# Patient Record
Sex: FEMALE | Race: WHITE | Employment: PART TIME | URBAN - METROPOLITAN AREA
[De-identification: names, ages, dates, MRNs, and addresses within clinical notes are randomized per-mention and may not be internally consistent; named-entity substitution may affect disease eponyms.]

---

## 2017-08-21 ENCOUNTER — HOSPITAL ENCOUNTER (INPATIENT)
Age: 67
LOS: 1 days | Discharge: HOME | DRG: 176 | End: 2017-08-22
Attending: EMERGENCY MEDICINE | Admitting: INTERNAL MEDICINE
Payer: MEDICARE

## 2017-08-21 ENCOUNTER — APPOINTMENT (OUTPATIENT)
Dept: CT IMAGING | Age: 67
DRG: 176 | End: 2017-08-21
Attending: EMERGENCY MEDICINE
Payer: MEDICARE

## 2017-08-21 ENCOUNTER — APPOINTMENT (OUTPATIENT)
Dept: GENERAL RADIOLOGY | Age: 67
DRG: 176 | End: 2017-08-21
Attending: EMERGENCY MEDICINE
Payer: MEDICARE

## 2017-08-21 DIAGNOSIS — R07.9 CHEST PAIN, UNSPECIFIED TYPE: ICD-10-CM

## 2017-08-21 DIAGNOSIS — R53.1 GENERALIZED WEAKNESS: Primary | ICD-10-CM

## 2017-08-21 DIAGNOSIS — R06.02 SOB (SHORTNESS OF BREATH): ICD-10-CM

## 2017-08-21 PROBLEM — I26.99 PULMONARY EMBOLISM (HCC): Status: ACTIVE | Noted: 2017-08-21

## 2017-08-21 PROBLEM — E03.9 HYPOTHYROIDISM: Status: ACTIVE | Noted: 2017-08-21

## 2017-08-21 PROBLEM — I26.99 PE (PULMONARY THROMBOEMBOLISM) (HCC): Status: ACTIVE | Noted: 2017-08-21

## 2017-08-21 LAB
ALBUMIN SERPL-MCNC: 3.3 G/DL (ref 3.4–5)
ALBUMIN/GLOB SERPL: 0.8 {RATIO} (ref 0.8–1.7)
ALP SERPL-CCNC: 119 U/L (ref 45–117)
ALT SERPL-CCNC: 21 U/L (ref 13–56)
ANION GAP SERPL CALC-SCNC: 8 MMOL/L (ref 3–18)
APTT PPP: 113.9 SEC (ref 23–36.4)
APTT PPP: 25.4 SEC (ref 23–36.4)
AST SERPL-CCNC: 21 U/L (ref 15–37)
BILIRUB SERPL-MCNC: 0.5 MG/DL (ref 0.2–1)
BUN SERPL-MCNC: 12 MG/DL (ref 7–18)
BUN/CREAT SERPL: 21 (ref 12–20)
CALCIUM SERPL-MCNC: 10.3 MG/DL (ref 8.5–10.1)
CHLORIDE SERPL-SCNC: 106 MMOL/L (ref 100–108)
CK MB CFR SERPL CALC: NORMAL % (ref 0–4)
CK MB SERPL-MCNC: <1 NG/ML (ref 5–25)
CK SERPL-CCNC: 83 U/L (ref 26–192)
CO2 SERPL-SCNC: 25 MMOL/L (ref 21–32)
CREAT SERPL-MCNC: 0.56 MG/DL (ref 0.6–1.3)
ERYTHROCYTE [DISTWIDTH] IN BLOOD BY AUTOMATED COUNT: 13.8 % (ref 11.6–14.5)
GLOBULIN SER CALC-MCNC: 4.4 G/DL (ref 2–4)
GLUCOSE SERPL-MCNC: 85 MG/DL (ref 74–99)
HCT VFR BLD AUTO: 30.2 % (ref 35–45)
HGB BLD-MCNC: 9.8 G/DL (ref 12–16)
INR PPP: 1 (ref 0.8–1.2)
MCH RBC QN AUTO: 27.8 PG (ref 24–34)
MCHC RBC AUTO-ENTMCNC: 32.5 G/DL (ref 31–37)
MCV RBC AUTO: 85.6 FL (ref 74–97)
PLATELET # BLD AUTO: 222 K/UL (ref 135–420)
PMV BLD AUTO: 9.8 FL (ref 9.2–11.8)
POTASSIUM SERPL-SCNC: 3.1 MMOL/L (ref 3.5–5.5)
PROT SERPL-MCNC: 7.7 G/DL (ref 6.4–8.2)
PROTHROMBIN TIME: 13.1 SEC (ref 11.5–15.2)
RBC # BLD AUTO: 3.53 M/UL (ref 4.2–5.3)
SODIUM SERPL-SCNC: 139 MMOL/L (ref 136–145)
T4 FREE SERPL-MCNC: 1.2 NG/DL (ref 0.7–1.5)
TROPONIN I SERPL-MCNC: <0.02 NG/ML (ref 0–0.04)
TSH SERPL DL<=0.05 MIU/L-ACNC: 1.07 UIU/ML (ref 0.36–3.74)
WBC # BLD AUTO: 4 K/UL (ref 4.6–13.2)

## 2017-08-21 PROCEDURE — 74011250636 HC RX REV CODE- 250/636: Performed by: INTERNAL MEDICINE

## 2017-08-21 PROCEDURE — 82550 ASSAY OF CK (CPK): CPT | Performed by: EMERGENCY MEDICINE

## 2017-08-21 PROCEDURE — 85730 THROMBOPLASTIN TIME PARTIAL: CPT | Performed by: INTERNAL MEDICINE

## 2017-08-21 PROCEDURE — 74011250636 HC RX REV CODE- 250/636: Performed by: EMERGENCY MEDICINE

## 2017-08-21 PROCEDURE — 84439 ASSAY OF FREE THYROXINE: CPT | Performed by: EMERGENCY MEDICINE

## 2017-08-21 PROCEDURE — 74011000258 HC RX REV CODE- 258: Performed by: EMERGENCY MEDICINE

## 2017-08-21 PROCEDURE — 80053 COMPREHEN METABOLIC PANEL: CPT | Performed by: EMERGENCY MEDICINE

## 2017-08-21 PROCEDURE — 85730 THROMBOPLASTIN TIME PARTIAL: CPT | Performed by: EMERGENCY MEDICINE

## 2017-08-21 PROCEDURE — 99285 EMERGENCY DEPT VISIT HI MDM: CPT

## 2017-08-21 PROCEDURE — 71010 XR CHEST SNGL V: CPT

## 2017-08-21 PROCEDURE — 93005 ELECTROCARDIOGRAM TRACING: CPT

## 2017-08-21 PROCEDURE — 74011250637 HC RX REV CODE- 250/637: Performed by: EMERGENCY MEDICINE

## 2017-08-21 PROCEDURE — 85610 PROTHROMBIN TIME: CPT | Performed by: EMERGENCY MEDICINE

## 2017-08-21 PROCEDURE — 71275 CT ANGIOGRAPHY CHEST: CPT

## 2017-08-21 PROCEDURE — 85027 COMPLETE CBC AUTOMATED: CPT | Performed by: EMERGENCY MEDICINE

## 2017-08-21 PROCEDURE — 93970 EXTREMITY STUDY: CPT

## 2017-08-21 PROCEDURE — 96365 THER/PROPH/DIAG IV INF INIT: CPT

## 2017-08-21 PROCEDURE — 36415 COLL VENOUS BLD VENIPUNCTURE: CPT | Performed by: INTERNAL MEDICINE

## 2017-08-21 PROCEDURE — 84443 ASSAY THYROID STIM HORMONE: CPT | Performed by: EMERGENCY MEDICINE

## 2017-08-21 PROCEDURE — 74011636320 HC RX REV CODE- 636/320: Performed by: EMERGENCY MEDICINE

## 2017-08-21 PROCEDURE — 96361 HYDRATE IV INFUSION ADD-ON: CPT

## 2017-08-21 PROCEDURE — 65660000000 HC RM CCU STEPDOWN

## 2017-08-21 RX ORDER — SODIUM CHLORIDE 0.9 % (FLUSH) 0.9 %
5-10 SYRINGE (ML) INJECTION EVERY 8 HOURS
Status: DISCONTINUED | OUTPATIENT
Start: 2017-08-21 | End: 2017-08-22 | Stop reason: HOSPADM

## 2017-08-21 RX ORDER — LEVOTHYROXINE SODIUM 100 UG/1
100 TABLET ORAL
COMMUNITY

## 2017-08-21 RX ORDER — MORPHINE SULFATE 2 MG/ML
2 INJECTION, SOLUTION INTRAMUSCULAR; INTRAVENOUS
Status: DISCONTINUED | OUTPATIENT
Start: 2017-08-21 | End: 2017-08-22 | Stop reason: HOSPADM

## 2017-08-21 RX ORDER — SODIUM CHLORIDE 9 MG/ML
100 INJECTION, SOLUTION INTRAVENOUS
Status: COMPLETED | OUTPATIENT
Start: 2017-08-21 | End: 2017-08-21

## 2017-08-21 RX ORDER — SODIUM CHLORIDE 9 MG/ML
150 INJECTION, SOLUTION INTRAVENOUS CONTINUOUS
Status: DISPENSED | OUTPATIENT
Start: 2017-08-21 | End: 2017-08-21

## 2017-08-21 RX ORDER — POTASSIUM CHLORIDE 1.5 G/1.77G
40 POWDER, FOR SOLUTION ORAL
Status: COMPLETED | OUTPATIENT
Start: 2017-08-21 | End: 2017-08-21

## 2017-08-21 RX ORDER — SODIUM CHLORIDE 0.9 % (FLUSH) 0.9 %
5-10 SYRINGE (ML) INJECTION AS NEEDED
Status: DISCONTINUED | OUTPATIENT
Start: 2017-08-21 | End: 2017-08-22 | Stop reason: HOSPADM

## 2017-08-21 RX ORDER — HEPARIN SODIUM 10000 [USP'U]/100ML
18-36 INJECTION, SOLUTION INTRAVENOUS
Status: DISCONTINUED | OUTPATIENT
Start: 2017-08-21 | End: 2017-08-22 | Stop reason: HOSPADM

## 2017-08-21 RX ORDER — HEPARIN SODIUM 10000 [USP'U]/100ML
18 INJECTION, SOLUTION INTRAVENOUS CONTINUOUS
Status: DISCONTINUED | OUTPATIENT
Start: 2017-08-21 | End: 2017-08-21

## 2017-08-21 RX ORDER — HEPARIN SODIUM 5000 [USP'U]/ML
80 INJECTION, SOLUTION INTRAVENOUS; SUBCUTANEOUS ONCE
Status: COMPLETED | OUTPATIENT
Start: 2017-08-21 | End: 2017-08-21

## 2017-08-21 RX ADMIN — SODIUM CHLORIDE 1000 ML: 900 INJECTION, SOLUTION INTRAVENOUS at 11:46

## 2017-08-21 RX ADMIN — SODIUM CHLORIDE 90 ML: 900 INJECTION, SOLUTION INTRAVENOUS at 12:47

## 2017-08-21 RX ADMIN — POTASSIUM CHLORIDE 40 MEQ: 1.5 POWDER, FOR SOLUTION ORAL at 14:17

## 2017-08-21 RX ADMIN — IOPAMIDOL 71 ML: 755 INJECTION, SOLUTION INTRAVENOUS at 12:47

## 2017-08-21 RX ADMIN — MORPHINE SULFATE 2 MG: 2 INJECTION, SOLUTION INTRAMUSCULAR; INTRAVENOUS at 15:51

## 2017-08-21 RX ADMIN — SODIUM CHLORIDE 150 ML/HR: 900 INJECTION, SOLUTION INTRAVENOUS at 11:46

## 2017-08-21 RX ADMIN — Medication 10 ML: at 22:00

## 2017-08-21 RX ADMIN — HEPARIN SODIUM 3650 UNITS: 5000 INJECTION, SOLUTION INTRAVENOUS; SUBCUTANEOUS at 14:16

## 2017-08-21 RX ADMIN — HEPARIN SODIUM AND DEXTROSE 18 UNITS/KG/HR: 10000; 5 INJECTION INTRAVENOUS at 14:26

## 2017-08-21 NOTE — ED PROVIDER NOTES
HPI Comments: 5:20 AM    Janki Mccann is a 74 y.o. Female presents to the Ed and C/O weakness since 10AM. Pt states tightness in chest, SOB and lightheadedness. Pt describes sharp stabbing pain radiating to the back. Pt travelled to [address] [place] [address] for 800 miles for over 16 hours in the last 2 days. Pt denies N/V and fever and no known blood clot in lungs. Pt denies any other     The history is provided by the patient. No  was used. Past Medical History:   Diagnosis Date    Hypothyroidism        History reviewed. No pertinent surgical history. Family History:   Problem Relation Age of Onset    Heart Disease Father        Social History     Social History    Marital status:      Spouse name: N/A    Number of children: N/A    Years of education: N/A     Occupational History    Not on file. Social History Main Topics    Smoking status: Never Smoker    Smokeless tobacco: Never Used    Alcohol use No    Drug use: No    Sexual activity: Not on file     Other Topics Concern    Not on file     Social History Narrative    No narrative on file         ALLERGIES: Review of patient's allergies indicates no known allergies. Review of Systems   Constitutional: Negative for activity change, fatigue and fever. HENT: Negative for congestion and rhinorrhea. Eyes: Negative for visual disturbance. Respiratory: Positive for chest tightness and shortness of breath. Cardiovascular: Negative for chest pain and palpitations. Gastrointestinal: Negative for abdominal pain, diarrhea, nausea and vomiting. Genitourinary: Negative for dysuria and hematuria. Musculoskeletal: Negative for back pain. Skin: Negative for rash. Neurological: Positive for light-headedness. Negative for dizziness and weakness. All other systems reviewed and are negative.       Vitals:    08/21/17 1500 08/21/17 1515 08/21/17 1530 08/21/17 1545   BP: 155/60 149/63 151/64 155/65 Pulse: 84 79 82 81   Resp: 19 19 20 20   Temp:       SpO2: 100% 99% 100% 100%   Weight:       Height:                Physical Exam   Constitutional: She is oriented to person, place, and time. She appears well-developed and well-nourished. No distress. HENT:   Head: Normocephalic and atraumatic. Right Ear: External ear normal.   Left Ear: External ear normal.   Nose: Nose normal.   Mouth/Throat: Oropharynx is clear and moist.   Eyes: Conjunctivae and EOM are normal. Pupils are equal, round, and reactive to light. No scleral icterus. Neck: Normal range of motion. Neck supple. No JVD present. No tracheal deviation present. No thyromegaly present. no thyroid mass. Cardiovascular: Normal rate, regular rhythm, normal heart sounds and intact distal pulses. Exam reveals no gallop and no friction rub. No murmur heard. Pulmonary/Chest: Effort normal and breath sounds normal. She exhibits no tenderness. Abdominal: Soft. Bowel sounds are normal. She exhibits no distension. There is no tenderness. There is no rebound and no guarding. Musculoskeletal: Normal range of motion. She exhibits no edema or tenderness. Lymphadenopathy:     She has no cervical adenopathy. no cervical lymphadenopathy. Neurological: She is alert and oriented to person, place, and time. No cranial nerve deficit. Coordination normal.   No sensory loss, Gait normal, Motor 5/5   Skin: Skin is warm and dry. Psychiatric: She has a normal mood and affect. Her behavior is normal. Judgment and thought content normal.   Nursing note and vitals reviewed. MDM  Number of Diagnoses or Management Options  Chest pain, unspecified type:   Generalized weakness:   SOB (shortness of breath):   Diagnosis management comments: Patti Krishnamurthy is a 74 y.o.  Female presents with tightness in chest, lightheadedness and SOB; rule out anemia/electrolyte imbalance/thyroid disease/coronary artery disease/PE; pt has no comorbiditiy and no DAILY and these are new symptoms today and pt recently helped moved her daughter;   pt with heart score as following: history slightly suspicious and EKG is normal; age is 74 so she gets a +1, risk factors are 0, elevated troponin          Amount and/or Complexity of Data Reviewed  Clinical lab tests: reviewed and ordered  Tests in the radiology section of CPT®: reviewed and ordered  Tests in the medicine section of CPT®: reviewed and ordered  Obtain history from someone other than the patient: yes  Independent visualization of images, tracings, or specimens: yes    Critical Care  Total time providing critical care: 30-74 minutes (CRITICAL CARE:  I have spent 30 minutes of critical care time involved in lab review, consultations with specialist, family decision-making, and documentation. During this entire length of time I was immediately available to the patient. Critical Care: The reason for providing this level of medical care for this critically ill patient was due a critical illness that impaired one or more vital organ systems such that there was a high probability of imminent or life threatening deterioration in the patients condition. This care involved high complexity decision making to assess, manipulate, and support vital system functions, to treat this degreee vital organ system failure and to prevent further life threatening deterioration of the patients condition.   )    ED Course       Procedures           Vitals:  Patient Vitals for the past 12 hrs:   Temp Pulse Resp BP SpO2   08/21/17 1545 - 81 20 155/65 100 %   08/21/17 1530 - 82 20 151/64 100 %   08/21/17 1515 - 79 19 149/63 99 %   08/21/17 1500 - 84 19 155/60 100 %   08/21/17 1445 - 71 22 159/54 99 %   08/21/17 1430 - 76 17 159/63 100 %   08/21/17 1345 - 83 19 - 100 %   08/21/17 1215 - 72 17 140/66 99 %   08/21/17 1200 - 77 15 138/64 99 %   08/21/17 1145 - 73 19 144/63 99 %   08/21/17 1130 - 69 21 137/54 100 %   08/21/17 1116 - 75 18 142/62 99 % 08/21/17 1102 98.3 °F (36.8 °C) - - - -       Medications Ordered:  Medications   0.9% sodium chloride infusion (0 mL/hr IntraVENous IV Completed 8/21/17 1536)   sodium chloride (NS) flush 5-10 mL (not administered)   heparin 25,000 units in D5W 250 ml infusion (18 Units/kg/hr × 45.4 kg IntraVENous Continued On Admission 8/21/17 1537)   morphine injection 2 mg (2 mg IntraVENous Given 8/21/17 1551)   sodium chloride 0.9 % bolus infusion 1,000 mL (0 mL IntraVENous IV Completed 8/21/17 1246)   iopamidol (ISOVUE-370) 76 % injection 75 mL (71 mL IntraVENous Given 8/21/17 1247)   0.9% sodium chloride infusion 100 mL (90 mL IntraVENous Bolus 8/21/17 1247)   potassium chloride (KLOR-CON) packet 40 mEq (40 mEq Oral Given 8/21/17 1417)   heparin (porcine) injection 3,650 Units (3,650 Units IntraVENous Given 8/21/17 1416)       Lab Findings:  Recent Results (from the past 12 hour(s))   EKG, 12 LEAD, INITIAL    Collection Time: 08/21/17 11:11 AM   Result Value Ref Range    Ventricular Rate 75 BPM    Atrial Rate 75 BPM    P-R Interval 168 ms    QRS Duration 76 ms    Q-T Interval 400 ms    QTC Calculation (Bezet) 446 ms    Calculated P Axis 56 degrees    Calculated R Axis -26 degrees    Calculated T Axis -2 degrees    Diagnosis       Normal sinus rhythm  Minimal voltage criteria for LVH, may be normal variant  Nonspecific ST and T wave abnormality  Abnormal ECG  No previous ECGs available     CBC W/O DIFF    Collection Time: 08/21/17 11:25 AM   Result Value Ref Range    WBC 4.0 (L) 4.6 - 13.2 K/uL    RBC 3.53 (L) 4.20 - 5.30 M/uL    HGB 9.8 (L) 12.0 - 16.0 g/dL    HCT 30.2 (L) 35.0 - 45.0 %    MCV 85.6 74.0 - 97.0 FL    MCH 27.8 24.0 - 34.0 PG    MCHC 32.5 31.0 - 37.0 g/dL    RDW 13.8 11.6 - 14.5 %    PLATELET 217 790 - 652 K/uL    MPV 9.8 9.2 - 11.6 FL   METABOLIC PANEL, COMPREHENSIVE    Collection Time: 08/21/17 11:25 AM   Result Value Ref Range    Sodium 139 136 - 145 mmol/L    Potassium 3.1 (L) 3.5 - 5.5 mmol/L    Chloride 106 100 - 108 mmol/L    CO2 25 21 - 32 mmol/L    Anion gap 8 3.0 - 18 mmol/L    Glucose 85 74 - 99 mg/dL    BUN 12 7.0 - 18 MG/DL    Creatinine 0.56 (L) 0.6 - 1.3 MG/DL    BUN/Creatinine ratio 21 (H) 12 - 20      GFR est AA >60 >60 ml/min/1.73m2    GFR est non-AA >60 >60 ml/min/1.73m2    Calcium 10.3 (H) 8.5 - 10.1 MG/DL    Bilirubin, total 0.5 0.2 - 1.0 MG/DL    ALT (SGPT) 21 13 - 56 U/L    AST (SGOT) 21 15 - 37 U/L    Alk. phosphatase 119 (H) 45 - 117 U/L    Protein, total 7.7 6.4 - 8.2 g/dL    Albumin 3.3 (L) 3.4 - 5.0 g/dL    Globulin 4.4 (H) 2.0 - 4.0 g/dL    A-G Ratio 0.8 0.8 - 1.7     CARDIAC PANEL,(CK, CKMB & TROPONIN)    Collection Time: 08/21/17 11:25 AM   Result Value Ref Range    CK 83 26 - 192 U/L    CK - MB <1.0 <3.6 ng/ml    CK-MB Index  0.0 - 4.0 %     CALCULATION NOT PERFORMED WHEN RESULT IS BELOW LINEAR LIMIT    Troponin-I, Qt. <0.02 0.0 - 0.045 NG/ML   TSH 3RD GENERATION    Collection Time: 08/21/17 11:25 AM   Result Value Ref Range    TSH 1.07 0.36 - 3.74 uIU/mL   T4, FREE    Collection Time: 08/21/17 11:25 AM   Result Value Ref Range    T4, Free 1.2 0.7 - 1.5 NG/DL   PROTHROMBIN TIME + INR    Collection Time: 08/21/17 11:25 AM   Result Value Ref Range    Prothrombin time 13.1 11.5 - 15.2 sec    INR 1.0 0.8 - 1.2     PTT    Collection Time: 08/21/17 11:25 AM   Result Value Ref Range    aPTT 25.4 23.0 - 36.4 SEC       EKG Interpretation by ED physician:  Rhythm: NSR  Rate: 75 bpm  Interpretation: Left axial normal intervals, non-specific T wave inversion in inferior wall. EKG interpret by Johana Santana MD 11:12 AM          X-ray, CT or radiology findings or impressions:  XR CHEST SNGL V   Final Result   Impression:  No radiographic evidence of an acute abnormality. CTA CHEST W OR W WO CONT      IMPRESSION:     Nonocclusive filling defect right lower lobe pulmonary artery compatible with  small pulmonary embolism.  The findings were called to Dr. Sylvester Balderas in the ER on  8/21/2017 at 1351 hours by Dr. Socorro Darling. Band of atelectasis or scarring left lower lobe. DUPLEX LOWER EXT VENOUS BILAT         Progress notes, consult notes, or additional procedure notes:  Progress Note:   11:52 AM   Daughter said pt was lifting heavyweights 24 hours ago. 2:01 PM  All symptoms explained to the patient including CT and she agrees for admission. Reevaluation of the patient:   Stable  No distress  Heparin started  Awaiting bed. Hospitalist at bedside    Diagnosis:   1. Generalized weakness    2. Chest pain, unspecified type    3. SOB (shortness of breath)        Disposition: Adm    Follow-up Information     None           Patient's Medications   Start Taking    No medications on file   Continue Taking    LEVOTHYROXINE (SYNTHROID) 100 MCG TABLET    Take 100 mcg by mouth Daily (before breakfast). These Medications have changed    No medications on file   Stop Taking    No medications on file       Scribe Attestation      Dallas Garrison acting as a scribe for and in the presence of Sharon Flowers MD      August 21, 2017 at 11:21 AM       Provider Attestation:      I personally performed the services described in the documentation, reviewed the documentation, as recorded by the scribe in my presence, and it accurately and completely records my words and actions.  August 21, 2017 at 11:21 AM - Sharon Flowers MD

## 2017-08-21 NOTE — IP AVS SNAPSHOT
Summary of Care Report The Summary of Care report has been created to help improve care coordination. Users with access to [employer] or [address] (Web-based application) may access additional patient information including the Discharge Summary. If you are not currently a [address] user and need more information, please call the number listed below in the [place] [address] section and ask to be connected with Medical Records. Facility Information Name Address Phone [facility] [address] [facility] [address] [phone] Patient Information Patient Name Sex  Andres Morin ([id number]) Female 1950 Discharge Information Admitting Provider Service Area Unit Filemon Barajas MD / [address] [facility] / [phone] Discharge Provider Discharge Date/Time Discharge Disposition Destination (none) 2017 (Pending) AHR (none) Patient Language Language ENGLISH [13] Hospital Problems as of 2017  Reviewed: 2017  2:51 PM by Filemon Barajas MD  
  
  
  
 Class Noted - Resolved Last Modified POA Active Problems * (Principal)Pulmonary embolism ([address]  2017 - Present 2017 by Filemon Barajas MD Yes Entered by Filemon Barajas MD  
  Hypothyroidism  2017 - Present 2017 by Filemon Barajas MD Yes Entered by Filemon Barajas MD  
  PE (pulmonary thromboembolism) ([address]  2017 - Present 2017 by Filemon Barajas MD Unknown Entered by Filemon Barajas MD  
  
Non-Hospital Problems as of 2017  Reviewed: 2017  2:51 PM by Filemon Barajas MD  
 None You are allergic to the following No active allergies Current Discharge Medication List  
  
START taking these medications Dose & Instructions Dispensing Information Comments HYDROcodone-acetaminophen 5-325 mg per tablet Commonly known as:  Bianca Sabra Dose:  1 Tab Take 1 Tab by mouth every six (6) hours as needed for Pain. Max Daily Amount: 4 Tabs. Quantity:  20 Tab Refills:  0 CONTINUE these medications which have NOT CHANGED Dose & Instructions Dispensing Information Comments SYNTHROID 100 mcg tablet Generic drug:  levothyroxine Dose:  100 mcg Take 100 mcg by mouth Daily (before breakfast). Refills:  0 Follow-up Information Follow up With Details Comments Contact Info Phys Other, MD Schedule an appointment as soon as possible for a visit in 1 week For follow-up Patient can only remember the practice name and not the physician Discharge Instructions DISCHARGE SUMMARY from Nurse The following personal items are in your possession at time of discharge: 
 
Dental Appliances: Lowers, Partials Visual Aid: None Home Medications: None Jewelry: Watch, Ring Clothing: Richrd Danisha Other Valuables: Cell Phone, At bedside, Contact Lenses (wearing contact lenses) PATIENT INSTRUCTIONS: 
 
What to do at Home: 
 
Recommended activity: Activity as tolerated, If you experience any of the following symptoms chest pain, nausea, vomiting,bleeding, please follow up with PCP. *  Please give a list of your current medications to your Primary Care Provider. *  Please update this list whenever your medications are discontinued, doses are 
    changed, or new medications (including over-the-counter products) are added. *  Please carry medication information at all times in case of emergency situations. These are general instructions for a healthy lifestyle: No smoking/ No tobacco products/ Avoid exposure to second hand smoke Surgeon General's Warning:  Quitting smoking now greatly reduces serious risk to your health. Obesity, smoking, and sedentary lifestyle greatly increases your risk for illness A healthy diet, regular physical exercise & weight monitoring are important for maintaining a healthy lifestyle You may be retaining fluid if you have a history of heart failure or if you experience any of the following symptoms:  Weight gain of 3 pounds or more overnight or 5 pounds in a week, increased swelling in our hands or feet or shortness of breath while lying flat in bed. Please call your doctor as soon as you notice any of these symptoms; do not wait until your next office visit. Recognize signs and symptoms of STROKE: 
 
F-face looks uneven A-arms unable to move or move unevenly S-speech slurred or non-existent T-time-call 911 as soon as signs and symptoms begin-DO NOT go Back to bed or wait to see if you get better-TIME IS BRAIN. Warning Signs of HEART ATTACK Call 911 if you have these symptoms: 
? Chest discomfort. Most heart attacks involve discomfort in the center of the chest that lasts more than a few minutes, or that goes away and comes back. It can feel like uncomfortable pressure, squeezing, fullness, or pain. ? Discomfort in other areas of the upper body. Symptoms can include pain or discomfort in one or both arms, the back, neck, jaw, or stomach. ? Shortness of breath with or without chest discomfort. ? Other signs may include breaking out in a cold sweat, nausea, or lightheadedness. Don't wait more than five minutes to call [address]! Fast action can save your life. Calling 911 is almost always the fastest way to get lifesaving treatment. Emergency Medical Services staff can begin treatment when they arrive  up to an hour sooner than if someone gets to the hospital by car. The discharge information has been reviewed with the patient. The patient verbalized understanding.  
 
Discharge medications reviewed with the patient and appropriate educational materials and side effects teaching were provided. Pulmonary Embolism: Care Instructions Your Care Instructions Pulmonary embolism is the sudden blockage of an artery in the lung. Blood clots in the deep veins of the leg or pelvis (deep vein thrombosis, or DVT) are the most common cause. These blood clots can travel to the lungs. Pulmonary embolism can be very serious. Because you have had one pulmonary embolism, you are at greater risk for having another one. But you can take steps to prevent another pulmonary embolism by following your doctor's instructions. You will probably take a prescription blood-thinning medicine to prevent blood clots. A blood thinner can stop a blood clot from growing larger and prevent new clots from forming. Follow-up care is a key part of your treatment and safety. Be sure to make and go to all appointments, and call your doctor if you are having problems. It's also a good idea to know your test results and keep a list of the medicines you take. How can you care for yourself at home? · Take your medicines exactly as prescribed. Call your doctor if you think you are having a problem with your medicine. You will get more details on the specific medicines your doctor prescribes. · If you are taking a blood thinner, be sure you get instructions about how to take your medicine safely. Blood thinners can cause serious bleeding problems. Preventing future pulmonary embolisms · Exercise. Keep blood moving in your legs to keep clots from forming. If you are traveling by car, stop every hour or so. Get out and walk around for a few minutes. If you are traveling by bus, train, or plane, get out of your seat and walk up and down the aisles every hour or so. You also can do leg exercises while you are seated. Pump your feet up and down by pulling your toes up toward your knees then pointing them down. · Get up out of bed as soon as possible after an illness or surgery. · Do not smoke. If you need help quitting, talk to your doctor about stop-smoking programs and medicines. These can increase your chances of quitting for good. · Check with your doctor before taking hormone or birth control pills. These may increase your risk of blot clots. · Ask your doctor about wearing compression stockings to help prevent blood clots in your legs. You can buy these with a prescription at medical supply stores and some drugstores. When should you call for help? Call 911 anytime you think you may need emergency care. For example, call if: 
· You have shortness of breath. · You have chest pain. · You passed out (lost consciousness). · You cough up blood. Call your doctor now or seek immediate medical care if: 
· You have new or worsening pain or swelling in your leg. Watch closely for changes in your health, and be sure to contact your doctor if: 
· You do not get better as expected. Where can you learn more? Go to http://elaine-mitesh.info/. Enter J290 in the search box to learn more about \"Pulmonary Embolism: Care Instructions. \" Current as of: June 4, 2016 Content Version: 11.3 © 8698-3827 [employer]. Care instructions adapted under license by [employer] (which disclaims liability or warranty for this information). If you have questions about a medical condition or this instruction, always ask your healthcare professional. [place] [address] any warranty or liability for your use of this information. Chart Review Routing History No Routing History on File

## 2017-08-21 NOTE — H&P
Hospitalist Admission Note    NAME:  Janki Mccann   :   1950   MRN:   [id number]     Date/Time:  2017 3:02 PM    Subjective:     CHIEF COMPLAINT:    Chief Complaint   Patient presents with    Chest Pain       HISTORY OF PRESENT ILLNESS:     Daphney Bill is a 74 y.o.  female with h/o hypothyroidism,came to the ER because of chest tightness,short of breath and lightheadeness. She says that the pain did radiate to thye back. Patient says that she was standing outside when this happened and thought that it was because it was very hot. Once on the ER,CT of chest showed pulmonary embolism. Patient is reporting that she has traveled last week from [address] [place] [address] to [place] [address] by car and it lasted 2 days. She is denying legs pain. Past Medical History:   Diagnosis Date    Hypothyroidism         Social History   Substance Use Topics    Smoking status: Never Smoker    Smokeless tobacco: Never Used    Alcohol use No        Family History   Problem Relation Age of Onset    Heart Disease Father         No Known Allergies     Prior to Admission medications    Medication Sig Start Date End Date Taking? Authorizing Provider   levothyroxine (SYNTHROID) 100 mcg tablet Take 100 mcg by mouth Daily (before breakfast). Yes Phys Other, MD       REVIEW OF SYSTEMS:    Constitutional:  No fever or weight loss  HEENT:  No headache or visual changes  Cardiovascular:  No chest pain, no palpitations. Respiratory:  Short of breath and chest pain. GI:  No abdominal pain. No nausea or vomiting. No diarrhea  :  No hematuria or dysuria. No frequency, retention, urinary incontinence.   Skin:  No rashes or moles  Neuro:  No seizures or syncope  Hematological:  No bruising or bleeding  Endocrine:  No diabetes or thyroid disease  Objective:   VITALS:       Visit Vitals    /66    Pulse 83    Temp 98.3 °F (36.8 °C)    Resp 19    Ht 4' 11\" (1.499 m)    Wt 45.4 kg (100 lb)    SpO2 100%    BMI 20.2 kg/m2            PHYSICAL EXAM: General:    Lying in bed in no acute distress. HEENT:  Pupils equal.  Sclera anicteric. Conjunctiva pink. Mucous membranes                           moist  Neck:  Supple. Trachea midline. No accessory muscle use. No thyromegaly. No jugular venous distention  CV:                  Regular rate and rhythm. Lungs:   Clear to auscultation bilaterally. No Wheezing or Rhonchi. No rales. Normal to percussion  Abdomen:   Soft, non-tender. Not distended. Bowel sounds normal. No organomegaly  Extremities: No cyanosis. No edema. No clubbing  Neurologic: Alert and oriented X 3. Skin:                Warm and dry. No rashes.        LAB DATA REVIEWED:    Recent Results (from the past 24 hour(s))   EKG, 12 LEAD, INITIAL    Collection Time: 08/21/17 11:11 AM   Result Value Ref Range    Ventricular Rate 75 BPM    Atrial Rate 75 BPM    P-R Interval 168 ms    QRS Duration 76 ms    Q-T Interval 400 ms    QTC Calculation (Bezet) 446 ms    Calculated P Axis 56 degrees    Calculated R Axis -26 degrees    Calculated T Axis -2 degrees    Diagnosis       Normal sinus rhythm  Minimal voltage criteria for LVH, may be normal variant  Nonspecific ST and T wave abnormality  Abnormal ECG  No previous ECGs available     CBC W/O DIFF    Collection Time: 08/21/17 11:25 AM   Result Value Ref Range    WBC 4.0 (L) 4.6 - 13.2 K/uL    RBC 3.53 (L) 4.20 - 5.30 M/uL    HGB 9.8 (L) 12.0 - 16.0 g/dL    HCT 30.2 (L) 35.0 - 45.0 %    MCV 85.6 74.0 - 97.0 FL    MCH 27.8 24.0 - 34.0 PG    MCHC 32.5 31.0 - 37.0 g/dL    RDW 13.8 11.6 - 14.5 %    PLATELET 602 439 - 478 K/uL    MPV 9.8 9.2 - 80.5 FL   METABOLIC PANEL, COMPREHENSIVE    Collection Time: 08/21/17 11:25 AM   Result Value Ref Range    Sodium 139 136 - 145 mmol/L    Potassium 3.1 (L) 3.5 - 5.5 mmol/L    Chloride 106 100 - 108 mmol/L    CO2 25 21 - 32 mmol/L    Anion gap 8 3.0 - 18 mmol/L    Glucose 85 74 - 99 mg/dL    BUN 12 7.0 - 18 MG/DL Creatinine 0.56 (L) 0.6 - 1.3 MG/DL    BUN/Creatinine ratio 21 (H) 12 - 20      GFR est AA >60 >60 ml/min/1.73m2    GFR est non-AA >60 >60 ml/min/1.73m2    Calcium 10.3 (H) 8.5 - 10.1 MG/DL    Bilirubin, total 0.5 0.2 - 1.0 MG/DL    ALT (SGPT) 21 13 - 56 U/L    AST (SGOT) 21 15 - 37 U/L    Alk. phosphatase 119 (H) 45 - 117 U/L    Protein, total 7.7 6.4 - 8.2 g/dL    Albumin 3.3 (L) 3.4 - 5.0 g/dL    Globulin 4.4 (H) 2.0 - 4.0 g/dL    A-G Ratio 0.8 0.8 - 1.7     CARDIAC PANEL,(CK, CKMB & TROPONIN)    Collection Time: 08/21/17 11:25 AM   Result Value Ref Range    CK 83 26 - 192 U/L    CK - MB <1.0 <3.6 ng/ml    CK-MB Index  0.0 - 4.0 %     CALCULATION NOT PERFORMED WHEN RESULT IS BELOW LINEAR LIMIT    Troponin-I, Qt. <0.02 0.0 - 0.045 NG/ML   TSH 3RD GENERATION    Collection Time: 08/21/17 11:25 AM   Result Value Ref Range    TSH 1.07 0.36 - 3.74 uIU/mL   T4, FREE    Collection Time: 08/21/17 11:25 AM   Result Value Ref Range    T4, Free 1.2 0.7 - 1.5 NG/DL   PROTHROMBIN TIME + INR    Collection Time: 08/21/17 11:25 AM   Result Value Ref Range    Prothrombin time 13.1 11.5 - 15.2 sec    INR 1.0 0.8 - 1.2     PTT    Collection Time: 08/21/17 11:25 AM   Result Value Ref Range    aPTT 25.4 23.0 - 36.4 SEC       Assessment/Plan:      Principal Problem:    Pulmonary embolism ([address] (8/21/2017)    Active Problems:    Hypothyroidism (8/21/2017)    ___________________________________________________  PLAN:    1. Pumnonary embolism    -On heparin drip.    -I d/w patient regarding new oral AC,no decision made yet. -Morphine for pain. -ECHO ordered    2. Hypothyroidism    -On synthroid.   DVT prophylaxis:on heparin  Full code  Disposition:tbd.     ___________________________________________________  Admitting Physician: Florida Beltran MD

## 2017-08-21 NOTE — IP AVS SNAPSHOT
[address] [place] [address] 
[phone] Patient: Daysi Puente MRN: [id number] Current Discharge Medication List  
  
START taking these medications Dose & Instructions Dispensing Information Comments Morning Noon Evening Bedtime HYDROcodone-acetaminophen 5-325 mg per tablet Commonly known as:  Sophie Cornejo Your next dose is:  Every 6 hours As needed for pain Dose:  1 Tab Take 1 Tab by mouth every six (6) hours as needed for Pain. Max Daily Amount: 4 Tabs. Quantity:  20 Tab Refills:  0 CONTINUE these medications which have NOT CHANGED Dose & Instructions Dispensing Information Comments Morning Noon Evening Bedtime SYNTHROID 100 mcg tablet Generic drug:  levothyroxine Your next dose is:  Tomorrow Dose:  100 mcg Take 100 mcg by mouth Daily (before breakfast). Refills:  0 Where to Get Your Medications Information on where to get these meds will be given to you by the nurse or doctor. ! Ask your nurse or doctor about these medications HYDROcodone-acetaminophen 5-325 mg per tablet

## 2017-08-21 NOTE — ED TRIAGE NOTES
Patient arrived via EMS complaining of chest pain - mid sternal - \"Pressure pain\" that radiates to the back, that is now stronger in the back than in the chest. Patient was given 324 aspirin by EMS and 2 nitro, with no relief of the pain. Patient rates it a 5/10 pain. She is from [place], and drove up here for the [employer] homecoming of her son in law. She started driving on Wednesday morning.

## 2017-08-21 NOTE — PROCEDURES
[facility]  *** FINAL REPORT ***    Name: Nate Lozano  MRN: [id number]    Outpatient  : 1950  HIS Order #: [id number]  [address] [facility] Visit #: [id number]  Date: 21 Aug 2017    TYPE OF TEST: Peripheral Venous Testing    REASON FOR TEST  Suspected pulmonary embolism    Right Leg:-  Deep venous thrombosis:           No  Superficial venous thrombosis:    No  Deep venous insufficiency:        No  Superficial venous insufficiency: No    Left Leg:-  Deep venous thrombosis:           No  Superficial venous thrombosis:    No  Deep venous insufficiency:        No  Superficial venous insufficiency: No      INTERPRETATION/FINDINGS  Duplex images were obtained using 2-D gray scale, color flow, and  spectral Doppler analysis. Right leg :  1. Deep vein(s) visualized include the common femoral, deep femoral,  proximal femoral, mid femoral, distal femoral, popliteal(above knee),  popliteal(fossa), popliteal(below knee), posterior tibial and peroneal   veins. 2. No evidence of deep venous thrombosis detected in the veins  visualized. 3. Superficial vein(s) visualized include the great saphenous and  small saphenous veins. 4. No evidence of superficial thrombosis detected. 5. No evidence of reflux detected in the deep veins visualized. 6. No evidence of reflux detected in the superficial veins visualized. Left leg :  1. Deep vein(s) visualized include the common femoral, deep femoral,  proximal femoral, mid femoral, distal femoral, popliteal(above knee),  popliteal(fossa), popliteal(below knee), posterior tibial and peroneal   veins. 2. No evidence of deep venous thrombosis detected in the veins  visualized. 3. Superficial vein(s) visualized include the great saphenous and  small saphenous veins. 4. No evidence of superficial thrombosis detected. 5. No evidence of reflux detected in the deep veins visualized. 6. No evidence of reflux detected in the superficial veins visualized.     ADDITIONAL COMMENTS    I have personally reviewed the data relevant to the interpretation of  this  study. TECHNOLOGIST: Yann Almonte.  Hardy, RTR, RVT  Signed: 08/21/2017 04:41 PM    PHYSICIAN: Eduard Estrada MD  Signed: 08/21/2017 05:47 PM

## 2017-08-21 NOTE — IP AVS SNAPSHOT
[address] [place] [address] 
[phone] Patient: Jayjay Akhtar MRN: [id number] YLU:3/[id number] You are allergic to the following No active allergies Recent Documentation Height Weight BMI Smoking Status 1.499 m 45.9 kg 20.42 kg/m2 Never Smoker Emergency Contacts Name Discharge Info Relation Home Work Mobile [facility] DISCHARGE CAREGIVER [3] Friend [5] [phone] About your hospitalization You were admitted on:  August 21, 2017 You last received care in the:  [facility] [address] You were discharged on:  August 22, 2017 Unit phone number:  [phone] Why you were hospitalized Your primary diagnosis was:  Pulmonary Embolism (Hcc) Your diagnoses also included:  Hypothyroidism, Pe (Pulmonary Thromboembolism) (Hcc) Providers Seen During Your Hospitalizations Provider Role Specialty Primary office phone Marvin Wright MD Attending Provider Emergency Medicine [phone] Geovanny Guillen MD Attending Provider Internal Medicine [phone] Your Primary Care Physician (PCP) Primary Care Physician Office Phone Office Fax OTHER, PHYS ** None ** ** None ** Follow-up Information Follow up With Details Comments Contact Info Joyce Cooper MD Schedule an appointment as soon as possible for a visit in 1 week For follow-up Patient can only remember the practice name and not the physician Current Discharge Medication List  
  
START taking these medications Dose & Instructions Dispensing Information Comments Morning Noon Evening Bedtime HYDROcodone-acetaminophen 5-325 mg per tablet Commonly known as:  Janas Concord Your next dose is:  Every 6 hours As needed for pain Dose:  1 Tab Take 1 Tab by mouth every six (6) hours as needed for Pain. Max Daily Amount: 4 Tabs. Quantity:  20 Tab Refills:  0 CONTINUE these medications which have NOT CHANGED Dose & Instructions Dispensing Information Comments Morning Noon Evening Bedtime SYNTHROID 100 mcg tablet Generic drug:  levothyroxine Your next dose is:  Tomorrow Dose:  100 mcg Take 100 mcg by mouth Daily (before breakfast). Refills:  0 Where to Get Your Medications Information on where to get these meds will be given to you by the nurse or doctor. ! Ask your nurse or doctor about these medications HYDROcodone-acetaminophen 5-325 mg per tablet Discharge Instructions DISCHARGE SUMMARY from Nurse The following personal items are in your possession at time of discharge: 
 
Dental Appliances: Lowers, Partials Visual Aid: None Home Medications: None Jewelry: Watch, Ring Clothing: Gopi Amado Other Valuables: Cell Phone, At bedside, Contact Lenses (wearing contact lenses) PATIENT INSTRUCTIONS: 
 
What to do at Home: 
 
Recommended activity: Activity as tolerated, If you experience any of the following symptoms chest pain, nausea, vomiting,bleeding, please follow up with PCP. *  Please give a list of your current medications to your Primary Care Provider. *  Please update this list whenever your medications are discontinued, doses are 
    changed, or new medications (including over-the-counter products) are added. *  Please carry medication information at all times in case of emergency situations. These are general instructions for a healthy lifestyle: No smoking/ No tobacco products/ Avoid exposure to second hand smoke Surgeon General's Warning:  Quitting smoking now greatly reduces serious risk to your health. Obesity, smoking, and sedentary lifestyle greatly increases your risk for illness A healthy diet, regular physical exercise & weight monitoring are important for maintaining a healthy lifestyle You may be retaining fluid if you have a history of heart failure or if you experience any of the following symptoms:  Weight gain of 3 pounds or more overnight or 5 pounds in a week, increased swelling in our hands or feet or shortness of breath while lying flat in bed. Please call your doctor as soon as you notice any of these symptoms; do not wait until your next office visit. Recognize signs and symptoms of STROKE: 
 
F-face looks uneven A-arms unable to move or move unevenly S-speech slurred or non-existent T-time-call 911 as soon as signs and symptoms begin-DO NOT go Back to bed or wait to see if you get better-TIME IS BRAIN. Warning Signs of HEART ATTACK Call 911 if you have these symptoms: 
? Chest discomfort. Most heart attacks involve discomfort in the center of the chest that lasts more than a few minutes, or that goes away and comes back. It can feel like uncomfortable pressure, squeezing, fullness, or pain. ? Discomfort in other areas of the upper body. Symptoms can include pain or discomfort in one or both arms, the back, neck, jaw, or stomach. ? Shortness of breath with or without chest discomfort. ? Other signs may include breaking out in a cold sweat, nausea, or lightheadedness. Don't wait more than five minutes to call [address]! Fast action can save your life. Calling 911 is almost always the fastest way to get lifesaving treatment. Emergency Medical Services staff can begin treatment when they arrive  up to an hour sooner than if someone gets to the hospital by car. The discharge information has been reviewed with the patient. The patient verbalized understanding. Discharge medications reviewed with the patient and appropriate educational materials and side effects teaching were provided. Pulmonary Embolism: Care Instructions Your Care Instructions Pulmonary embolism is the sudden blockage of an artery in the lung. Blood clots in the deep veins of the leg or pelvis (deep vein thrombosis, or DVT) are the most common cause. These blood clots can travel to the lungs. Pulmonary embolism can be very serious. Because you have had one pulmonary embolism, you are at greater risk for having another one. But you can take steps to prevent another pulmonary embolism by following your doctor's instructions. You will probably take a prescription blood-thinning medicine to prevent blood clots. A blood thinner can stop a blood clot from growing larger and prevent new clots from forming. Follow-up care is a key part of your treatment and safety. Be sure to make and go to all appointments, and call your doctor if you are having problems. It's also a good idea to know your test results and keep a list of the medicines you take. How can you care for yourself at home? · Take your medicines exactly as prescribed. Call your doctor if you think you are having a problem with your medicine. You will get more details on the specific medicines your doctor prescribes. · If you are taking a blood thinner, be sure you get instructions about how to take your medicine safely. Blood thinners can cause serious bleeding problems. Preventing future pulmonary embolisms · Exercise. Keep blood moving in your legs to keep clots from forming. If you are traveling by car, stop every hour or so. Get out and walk around for a few minutes. If you are traveling by bus, train, or plane, get out of your seat and walk up and down the aisles every hour or so. You also can do leg exercises while you are seated. Pump your feet up and down by pulling your toes up toward your knees then pointing them down. · Get up out of bed as soon as possible after an illness or surgery. · Do not smoke.  If you need help quitting, talk to your doctor about stop-smoking programs and medicines. These can increase your chances of quitting for good. · Check with your doctor before taking hormone or birth control pills. These may increase your risk of blot clots. · Ask your doctor about wearing compression stockings to help prevent blood clots in your legs. You can buy these with a prescription at medical supply stores and some drugstores. When should you call for help? Call 911 anytime you think you may need emergency care. For example, call if: 
· You have shortness of breath. · You have chest pain. · You passed out (lost consciousness). · You cough up blood. Call your doctor now or seek immediate medical care if: 
· You have new or worsening pain or swelling in your leg. Watch closely for changes in your health, and be sure to contact your doctor if: 
· You do not get better as expected. Where can you learn more? Go to http://elaine-mitesh.info/. Enter Q606 in the search box to learn more about \"Pulmonary Embolism: Care Instructions. \" Current as of: June 4, 2016 Content Version: 11.3 © 9506-8368 [employer]. Care instructions adapted under license by [employer] (which disclaims liability or warranty for this information). If you have questions about a medical condition or this instruction, always ask your healthcare professional. [address] any warranty or liability for your use of this information. Discharge Orders None Introducing [facility]! Darion Chow introduces [employer] patient portal. Now you can access parts of your medical record, email your doctor's office, and request medication refills online. 1. In your internet browser, go to [email or web address] 2. Click on the First Time User? Click Here link in the Sign In box. You will see the New Member Sign Up page. 3. Enter your [employer] Access Code exactly as it appears below.  You will not need to use this code after youve completed the sign-up process. If you do not sign up before the expiration date, you must request a new code. · [employer] Access Code: [email or web address] Expires: 11/20/2017  4:59 PM 
 
4. Enter the last four digits of your Social Security Number (xxxx) and Date of Birth (mm/dd/yyyy) as indicated and click Submit. You will be taken to the next sign-up page. 5. Create a [employer] ID. This will be your [employer] login ID and cannot be changed, so think of one that is secure and easy to remember. 6. Create a [employer] password. You can change your password at any time. 7. Enter your Password Reset Question and Answer. This can be used at a later time if you forget your password. 8. Enter your e-mail address. You will receive e-mail notification when new information is available in [address] 9. Click Sign Up. You can now view and download portions of your medical record. 10. Click the Download Summary menu link to download a portable copy of your medical information. If you have questions, please visit the Frequently Asked Questions section of the [employer] website. Remember, [employer] is NOT to be used for urgent needs. For medical emergencies, dial 911. Now available from your iPhone and Android! General Information Please provide this summary of care documentation to your next provider. Patient Signature:  ____________________________________________________________ Date:  ____________________________________________________________  
  
[facility] Provider Signature:  ____________________________________________________________ Date:  ____________________________________________________________

## 2017-08-22 ENCOUNTER — APPOINTMENT (OUTPATIENT)
Dept: GENERAL RADIOLOGY | Age: 67
DRG: 176 | End: 2017-08-22
Attending: INTERNAL MEDICINE
Payer: MEDICARE

## 2017-08-22 VITALS
RESPIRATION RATE: 18 BRPM | BODY MASS INDEX: 20.38 KG/M2 | OXYGEN SATURATION: 92 % | HEIGHT: 59 IN | SYSTOLIC BLOOD PRESSURE: 135 MMHG | HEART RATE: 78 BPM | WEIGHT: 101.1 LBS | TEMPERATURE: 97.5 F | DIASTOLIC BLOOD PRESSURE: 59 MMHG

## 2017-08-22 LAB
ALBUMIN SERPL-MCNC: 3.5 G/DL (ref 3.4–5)
ALBUMIN/GLOB SERPL: 0.7 {RATIO} (ref 0.8–1.7)
ALP SERPL-CCNC: 133 U/L (ref 45–117)
ALT SERPL-CCNC: 21 U/L (ref 13–56)
ANION GAP SERPL CALC-SCNC: 6 MMOL/L (ref 3–18)
APTT PPP: 117.6 SEC (ref 23–36.4)
APTT PPP: 75.2 SEC (ref 23–36.4)
AST SERPL-CCNC: 20 U/L (ref 15–37)
ATRIAL RATE: 75 BPM
BASOPHILS # BLD: 0.1 K/UL (ref 0–0.06)
BASOPHILS NFR BLD: 1 % (ref 0–2)
BILIRUB SERPL-MCNC: 0.6 MG/DL (ref 0.2–1)
BUN SERPL-MCNC: 10 MG/DL (ref 7–18)
BUN/CREAT SERPL: 24 (ref 12–20)
CALCIUM SERPL-MCNC: 10.5 MG/DL (ref 8.5–10.1)
CALCULATED P AXIS, ECG09: 56 DEGREES
CALCULATED R AXIS, ECG10: -26 DEGREES
CALCULATED T AXIS, ECG11: -2 DEGREES
CHLORIDE SERPL-SCNC: 106 MMOL/L (ref 100–108)
CO2 SERPL-SCNC: 28 MMOL/L (ref 21–32)
CREAT SERPL-MCNC: 0.42 MG/DL (ref 0.6–1.3)
DIAGNOSIS, 93000: NORMAL
DIFFERENTIAL METHOD BLD: ABNORMAL
EOSINOPHIL # BLD: 0.2 K/UL (ref 0–0.4)
EOSINOPHIL NFR BLD: 4 % (ref 0–5)
ERYTHROCYTE [DISTWIDTH] IN BLOOD BY AUTOMATED COUNT: 14.1 % (ref 11.6–14.5)
GLOBULIN SER CALC-MCNC: 5.1 G/DL (ref 2–4)
GLUCOSE SERPL-MCNC: 95 MG/DL (ref 74–99)
HCT VFR BLD AUTO: 35 % (ref 35–45)
HGB BLD-MCNC: 11.2 G/DL (ref 12–16)
INR PPP: 1.1 (ref 0.8–1.2)
LYMPHOCYTES # BLD: 2.8 K/UL (ref 0.9–3.6)
LYMPHOCYTES NFR BLD: 53 % (ref 21–52)
MCH RBC QN AUTO: 27.9 PG (ref 24–34)
MCHC RBC AUTO-ENTMCNC: 32 G/DL (ref 31–37)
MCV RBC AUTO: 87.3 FL (ref 74–97)
MONOCYTES # BLD: 0.5 K/UL (ref 0.05–1.2)
MONOCYTES NFR BLD: 10 % (ref 3–10)
NEUTS SEG # BLD: 1.7 K/UL (ref 1.8–8)
NEUTS SEG NFR BLD: 32 % (ref 40–73)
P-R INTERVAL, ECG05: 168 MS
PLATELET # BLD AUTO: 272 K/UL (ref 135–420)
PMV BLD AUTO: 10.4 FL (ref 9.2–11.8)
POTASSIUM SERPL-SCNC: 3.7 MMOL/L (ref 3.5–5.5)
PROT SERPL-MCNC: 8.6 G/DL (ref 6.4–8.2)
PROTHROMBIN TIME: 14 SEC (ref 11.5–15.2)
Q-T INTERVAL, ECG07: 400 MS
QRS DURATION, ECG06: 76 MS
QTC CALCULATION (BEZET), ECG08: 446 MS
RBC # BLD AUTO: 4.01 M/UL (ref 4.2–5.3)
SODIUM SERPL-SCNC: 140 MMOL/L (ref 136–145)
VENTRICULAR RATE, ECG03: 75 BPM
WBC # BLD AUTO: 5.2 K/UL (ref 4.6–13.2)

## 2017-08-22 PROCEDURE — 85730 THROMBOPLASTIN TIME PARTIAL: CPT | Performed by: INTERNAL MEDICINE

## 2017-08-22 PROCEDURE — 36415 COLL VENOUS BLD VENIPUNCTURE: CPT | Performed by: INTERNAL MEDICINE

## 2017-08-22 PROCEDURE — 94760 N-INVAS EAR/PLS OXIMETRY 1: CPT

## 2017-08-22 PROCEDURE — 72070 X-RAY EXAM THORAC SPINE 2VWS: CPT

## 2017-08-22 PROCEDURE — 74011250636 HC RX REV CODE- 250/636: Performed by: HOSPITALIST

## 2017-08-22 PROCEDURE — 85025 COMPLETE CBC W/AUTO DIFF WBC: CPT | Performed by: INTERNAL MEDICINE

## 2017-08-22 PROCEDURE — 72100 X-RAY EXAM L-S SPINE 2/3 VWS: CPT

## 2017-08-22 PROCEDURE — 80053 COMPREHEN METABOLIC PANEL: CPT | Performed by: INTERNAL MEDICINE

## 2017-08-22 PROCEDURE — 93306 TTE W/DOPPLER COMPLETE: CPT

## 2017-08-22 PROCEDURE — 85610 PROTHROMBIN TIME: CPT | Performed by: INTERNAL MEDICINE

## 2017-08-22 PROCEDURE — 74011250636 HC RX REV CODE- 250/636: Performed by: INTERNAL MEDICINE

## 2017-08-22 RX ORDER — HYDRALAZINE HYDROCHLORIDE 20 MG/ML
10 INJECTION INTRAMUSCULAR; INTRAVENOUS ONCE
Status: COMPLETED | OUTPATIENT
Start: 2017-08-22 | End: 2017-08-22

## 2017-08-22 RX ORDER — HYDROCODONE BITARTRATE AND ACETAMINOPHEN 5; 325 MG/1; MG/1
1 TABLET ORAL
Qty: 20 TAB | Refills: 0 | Status: SHIPPED | OUTPATIENT
Start: 2017-08-22

## 2017-08-22 RX ORDER — LEVOTHYROXINE SODIUM 100 UG/1
100 TABLET ORAL
Status: DISCONTINUED | OUTPATIENT
Start: 2017-08-23 | End: 2017-08-22 | Stop reason: HOSPADM

## 2017-08-22 RX ADMIN — Medication 10 ML: at 06:00

## 2017-08-22 RX ADMIN — HYDRALAZINE HYDROCHLORIDE 10 MG: 20 INJECTION INTRAMUSCULAR; INTRAVENOUS at 05:29

## 2017-08-22 RX ADMIN — MORPHINE SULFATE 2 MG: 2 INJECTION, SOLUTION INTRAMUSCULAR; INTRAVENOUS at 05:33

## 2017-08-22 RX ADMIN — Medication 10 ML: at 16:45

## 2017-08-22 NOTE — ROUTINE PROCESS
1000 Heparin drip adjusted. Patient off the floor to Radiology. 1130 Patient back from Radiology. Denies any pain. 1150 Called Echocardiogram - will do test this afternoon. 1430 Patient off the floor for Echocardiogram.   1530 Patient back in the floor. 1654 Patient for discharge today. Heparin stopped. 1715 Discharge instruction given. Encouraged to follow-up with PCP. Pain prescription handed.

## 2017-08-22 NOTE — ACP (ADVANCE CARE PLANNING)
Patient has designated ___husband_____________________ to participate in his/her discharge plan and to receive any needed information.      Name: [facility]  Address: same as pt  Phone number: [phone]

## 2017-08-22 NOTE — DISCHARGE INSTRUCTIONS
DISCHARGE SUMMARY from Nurse    The following personal items are in your possession at time of discharge:    Dental Appliances: Lowers, Partials  Visual Aid: None     Home Medications: None  Jewelry: Watch, Ring  Clothing: Footwear, Dress  Other Valuables: Cell Phone, At bedside, Contact Lenses (wearing contact lenses)             PATIENT INSTRUCTIONS:    What to do at Home:    Recommended activity: Activity as tolerated,     If you experience any of the following symptoms chest pain, nausea, vomiting,bleeding, please follow up with PCP. *  Please give a list of your current medications to your Primary Care Provider. *  Please update this list whenever your medications are discontinued, doses are      changed, or new medications (including over-the-counter products) are added. *  Please carry medication information at all times in case of emergency situations. These are general instructions for a healthy lifestyle:    No smoking/ No tobacco products/ Avoid exposure to second hand smoke    Surgeon General's Warning:  Quitting smoking now greatly reduces serious risk to your health. Obesity, smoking, and sedentary lifestyle greatly increases your risk for illness    A healthy diet, regular physical exercise & weight monitoring are important for maintaining a healthy lifestyle    You may be retaining fluid if you have a history of heart failure or if you experience any of the following symptoms:  Weight gain of 3 pounds or more overnight or 5 pounds in a week, increased swelling in our hands or feet or shortness of breath while lying flat in bed. Please call your doctor as soon as you notice any of these symptoms; do not wait until your next office visit.     Recognize signs and symptoms of STROKE:    F-face looks uneven    A-arms unable to move or move unevenly    S-speech slurred or non-existent    T-time-call 911 as soon as signs and symptoms begin-DO NOT go       Back to bed or wait to see if you get better-TIME IS BRAIN. Warning Signs of HEART ATTACK     Call 911 if you have these symptoms:   Chest discomfort. Most heart attacks involve discomfort in the center of the chest that lasts more than a few minutes, or that goes away and comes back. It can feel like uncomfortable pressure, squeezing, fullness, or pain.  Discomfort in other areas of the upper body. Symptoms can include pain or discomfort in one or both arms, the back, neck, jaw, or stomach.  Shortness of breath with or without chest discomfort.  Other signs may include breaking out in a cold sweat, nausea, or lightheadedness. Don't wait more than five minutes to call 911 - MINUTES MATTER! Fast action can save your life. Calling 911 is almost always the fastest way to get lifesaving treatment. Emergency Medical Services staff can begin treatment when they arrive -- up to an hour sooner than if someone gets to the hospital by car. The discharge information has been reviewed with the patient. The patient verbalized understanding. Discharge medications reviewed with the patient and appropriate educational materials and side effects teaching were provided. Pulmonary Embolism: Care Instructions  Your Care Instructions  Pulmonary embolism is the sudden blockage of an artery in the lung. Blood clots in the deep veins of the leg or pelvis (deep vein thrombosis, or DVT) are the most common cause. These blood clots can travel to the lungs. Pulmonary embolism can be very serious. Because you have had one pulmonary embolism, you are at greater risk for having another one. But you can take steps to prevent another pulmonary embolism by following your doctor's instructions. You will probably take a prescription blood-thinning medicine to prevent blood clots. A blood thinner can stop a blood clot from growing larger and prevent new clots from forming. Follow-up care is a key part of your treatment and safety.  Be sure to make and go to all appointments, and call your doctor if you are having problems. It's also a good idea to know your test results and keep a list of the medicines you take. How can you care for yourself at home? · Take your medicines exactly as prescribed. Call your doctor if you think you are having a problem with your medicine. You will get more details on the specific medicines your doctor prescribes. · If you are taking a blood thinner, be sure you get instructions about how to take your medicine safely. Blood thinners can cause serious bleeding problems. Preventing future pulmonary embolisms  · Exercise. Keep blood moving in your legs to keep clots from forming. If you are traveling by car, stop every hour or so. Get out and walk around for a few minutes. If you are traveling by bus, train, or plane, get out of your seat and walk up and down the aisles every hour or so. You also can do leg exercises while you are seated. Pump your feet up and down by pulling your toes up toward your knees then pointing them down. · Get up out of bed as soon as possible after an illness or surgery. · Do not smoke. If you need help quitting, talk to your doctor about stop-smoking programs and medicines. These can increase your chances of quitting for good. · Check with your doctor before taking hormone or birth control pills. These may increase your risk of blot clots. · Ask your doctor about wearing compression stockings to help prevent blood clots in your legs. You can buy these with a prescription at medical supply stores and some drugstores. When should you call for help? Call 911 anytime you think you may need emergency care. For example, call if:  · You have shortness of breath. · You have chest pain. · You passed out (lost consciousness). · You cough up blood. Call your doctor now or seek immediate medical care if:  · You have new or worsening pain or swelling in your leg.   Watch closely for changes in your health, and be sure to contact your doctor if:  · You do not get better as expected. Where can you learn more? Go to http://elaine-mitesh.info/. Enter D000 in the search box to learn more about \"Pulmonary Embolism: Care Instructions. \"  Current as of: June 4, 2016  Content Version: 11.3  © 5593-8635 [employer]. Care instructions adapted under license by [employer] (which disclaims liability or warranty for this information). If you have questions about a medical condition or this instruction, always ask your healthcare professional. [place] [address] any warranty or liability for your use of this information.

## 2017-08-22 NOTE — DISCHARGE SUMMARY
Discharge Summary    Patient: Homa Shi               Sex: female          DOA: 8/21/2017         YOB: 1950      Age:  74 y.o.        LOS:  LOS: 1 day                Admit Date: 8/21/2017    Discharge Date: 8/22/2017    Admission Diagnoses: PE (pulmonary thromboembolism) ([facility] [address]    Discharge Diagnoses:    Possible PE  Near syncope   Heart murmur  Back pain Hypothyroidism  Volume depletion. Hypothyroidism    Discharge Medications:     Current Discharge Medication List      START taking these medications    Details   HYDROcodone-acetaminophen (NORCO) 5-325 mg per tablet Take 1 Tab by mouth every six (6) hours as needed for Pain. Max Daily Amount: 4 Tabs. Qty: 20 Tab, Refills: 0         CONTINUE these medications which have NOT CHANGED    Details   levothyroxine (SYNTHROID) 100 mcg tablet Take 100 mcg by mouth Daily (before breakfast). Follow-up:pcp    Discharge Condition:good    Activity:as tolerated    Diet:regular    Labs:  Labs: Results:       Chemistry Recent Labs      08/22/17   0611  08/21/17   1125   GLU  95  85   NA  140  139   K  3.7  3.1*   CL  106  106   CO2  28  25   BUN  10  12   CREA  0.42*  0.56*   CA  10.5*  10.3*   AGAP  6  8   BUCR  24*  21*   AP  133*  119*   TP  8.6*  7.7   ALB  3.5  3.3*   GLOB  5.1*  4.4*   AGRAT  0.7*  0.8      CBC w/Diff Recent Labs      08/22/17   0611  08/21/17   1125   WBC  5.2  4.0*   RBC  4.01*  3.53*   HGB  11.2*  9.8*   HCT  35.0  30.2*   PLT  272  222   GRANS  32*   --    LYMPH  53*   --    EOS  4   --       Cardiac Enzymes Recent Labs      08/21/17   1125   CPK  83   CKND1  CALCULATION NOT PERFORMED WHEN RESULT IS BELOW LINEAR LIMIT      Coagulation Recent Labs      08/22/17   1545  08/22/17   0611   08/21/17   1125   PTP   --   14.0   --   13.1   INR   --   1.1   --   1.0   APTT  75.2*  117.6*   < >  25.4    < > = values in this interval not displayed.        Lipid Panel No results found for: CHOL, [address], [address] [facility], [address] [place] [address], [address] [place] [address] Rd, [id number], HDL, LDL, LDLC, DLDLP, 169104, VLDLC, VLDL, TGLX, TRIGL, TRIGP, TGLPOCT, CHHD, CHHDX   BNP No results for input(s): BNPP in the last 72 hours. Liver Enzymes Recent Labs      08/22/17   0611   TP  8.6*   ALB  3.5   AP  133*   SGOT  20      Thyroid Studies Lab Results   Component Value Date/Time    TSH 1.07 08/21/2017 11:25 AM          Imaging:  CTA chest on 8/21/2017:  Nonocclusive filling defect right lower lobe pulmonary artery compatible with  small pulmonary embolism. The findings were called to Dr. Toni Barrett in the ER on  8/21/2017 at 1351 hours by Dr. Francois Segura. Band of atelectasis or scarring left lower lobe. Peripheral Venous Testing  REASON FOR TEST  Suspected pulmonary embolism     Right Leg:-  Deep venous thrombosis:           No  Superficial venous thrombosis:    No  Deep venous insufficiency:        No  Superficial venous insufficiency: No     Left Leg:-  Deep venous thrombosis:           No  Superficial venous thrombosis:    No  Deep venous insufficiency:        No  Superficial venous insufficiency: No    Consults:   Pulmonary    Treatment Team: Treatment Team: Attending Provider: Efrem Falk MD; Utilization Review: Nikhil Ag, BRISA; Utilization Review: Marlys Thomason RN    Significant Diagnostic Studies:see recent lab results    HISTORY OF PRESENT ILLNESS:     Ted Patel is a 74 y.o.  female with h/o hypothyroidism,came to the ER because of chest tightness,short of breath and lightheadeness. She says that the pain did radiate to thye back. Patient says that she was standing outside when this happened and thought that it was because it was very hot. Once on the ER,CT of chest showed pulmonary embolism. Patient is reporting that she has traveled last week from [address] [place] [address] to [place] [address] by car and it lasted 2 days. She is denying legs pain. Hospital Course:  After being admitted,patient was continued on heparin drip. ECHO was ordered,results pending for now. Pulmonologist was consulted and evaluated patient. After reviewed all the tests,he concluded that patient did not need AC. Patient's complaint was suggestive of orthostasis,mild heat exhaustion. He indicated that patient has a systolic murmur which needs re-evaluation,I defer it to his pcp. ECHO was done but results are pending for now. During thisd admission,patient also complained of back pain,mostly T-spine and Xray done showed minimal spondylosis without acute or aggressive osseous lesion demonstrated. Patient was prescribed norco on discharge. Patient is clinically stable and will be discharged. P/E  Lungs ctab  Heart s1s2 nl,no m/g  Abdm:soft,not tender,bs present. Extr:no edema,good pulses. Neuro:aaox4    Time for discharge:35 minutes.     Mae Burroughs MD  August 22, 2017

## 2017-08-22 NOTE — MANAGEMENT PLAN
[facility]   Discharge Planning/ Assessment    Reasons for Intervention:   Interviewed patient. Verified demographics listed on face sheet with patient; all information correct. Pt lives in [place] and is here visiting daughter and her  who just returned from deployment. Pt has Medicare A&B and [place] [employer]. Patient stated their PCP is Dr Yesenia Dutton and last appt 2 weeks ago . Patient's NOK is , Mart Listen at [phone]. Patient independent with ADLs prior to admission. No use of DME prior to admission.  Discharge plan is Home and follow up with pcp  Medardo Wise RN BSN  Outcomes Manager  Pager # [phone]    High Risk Criteria  [] Yes  [x]No   Physician Referral  [] Yes  [x]No        Date    Nursing Referral  [] Yes  [x]No        Date    Patient/Family Request  [] Yes  [x]No        Date       Resources:    Medicare  [x] Yes  []No   Medicaid  [] Yes  [x]No   No Resources  [] Yes  [x]No   Private Insurance  [x] Yes  []No    Name/Phone Number    Other  [] Yes  [x]No        (i.e. Workman's Comp)         Prior Services:    Prior Services  [] Yes  [x]No   Home Health  [] Yes  [x]No   [address] [place]  [] Yes  [x]No        Number of [address]  [] Yes  [x]No       Meals on Wheels  [] Yes  [x]No   Office on Aging  [] Yes  [x]No   Transportation Services  [] Yes  [x]No   Nursing Home  [] Yes  [x]No        Nursing Home Name    [address]  [] Yes  [x]No        [address] Name    Other       Information Source:      Information obtained from  [x] Patient  [] Parent   [] [address]   [] Child  [] Spouse   [] Significant Other/Partner   [] Friend      [] EMS    [] Nursing Home Chart          [] Other:   Chart Review  [x] Yes  []No     Family/Support System:    Patient lives with  [] Alone    [x] Spouse   [] Significant Other  [] Children  [] Caretaker   [] Parent  [] Sibling     [] Other       Other Support System:    Is the patient responsible for care of others  [] Yes  [x]No   Information of person caring for patient on  discharge    Managers financial affairs independently  [x] Yes  []No   If no, explain:      Status Prior to Admission:    Mental Status  [x] Awake  [x] Alert  [x] Oriented  [] Quiet/Calm [] Lethargic/Sedated   [] Disoriented  [] Restless/Anxious  [] Combative   Personal Care  [] Dependent  [x] [address]  [] Requires Assistance   Meal Preparation Ability  [x] Independent   [] Standby Assistance   [] Minimal Assistance   [] Moderate Assistance  [] Maximum Assistance     [] Total Assistance   Chores  [x] Independent with Chores   [] [address] [place] Resident   [] Requires Assistance   Bowel/Bladder  [x] Continent  [] Catheter  [] Incontinent  [] Ostomy Self-Care    [] Urine Diversion Self-Care  [] Maximum Assistance     [] Total Assistance   Number of Persons needed for assistance    DME at home  [] Florentin Kirby  [] Eduardo Kirby   [] Commode    [] Bathroom/Grab Bars  [] Hospital Bed  [] Nebulizer  [] Oxygen           [] Raised Toilet Seat  [] Shower Chair  [] Side Rails for Bed   [] Tub Transfer Bench   [] Effie Choudhary  [] James James      [] Other:   Vendor      Treatment Presently Receiving:    Current Treatments  [] Chemotherapy  [] Dialysis  [] Insulin  [] IVAB [x] IVF   [] O2  [] PCA   [] PT   [] RT   [] Tube Feedings   [] Wound Care     Psychosocial Evaluation:    Verbalized Knowledge of Disease Process  [] Patient  []Family   Coping with Disease Process  [] Patient  []Family   Requires Further Counseling Coping with Disease Process  [] Patient  []Family     Identified Projected Needs:    Home Health Aid  [] Yes  [x]No   Transportation  [] Yes  [x]No   Education  [] Yes  [x]No        Specific Education     Financial Counseling  [] Yes  [x]No   Inability to Care for Self/Will Require 24 hour care  [] Yes  [x]No   Pain Management  [] Yes  [x]No   Home Infusion Therapy  [] Yes  [x]No   Oxygen Therapy  [] Yes  [x]No   DME  [] Yes  [x]No   Long Term Care Placement  [] Yes  [x]No   Rehab  [] Yes  [x]No   Physical Therapy  [] Yes  [x]No   Needs Anticipated At This Time  [] Yes  [x]No     Intra-Hospital Referral:    [address] [facility]  [] Yes  [x]No     [] Yes  [x]No   Patient Representative  [] Yes  [x]No   Staff for Teaching Needs  [] Yes  [x]No   Specialty Teaching Needs     Diabetic Educator  [] Yes  [x]No   Referral for Diabetic Educator Needed  [] Yes  [x]No  If Yes, place order for Nutritionist or Diabetic Consult     Tentative Discharge Plan:    Home with No Services  [x] Yes  []No   Home with [address]  [] Yes  [x]No        If Yes, specify type    [address]  [] Yes  [x]No        If Yes, specify type    Meals on Wheels  [] Yes  [x]No   Office of Aging  [] Yes  [x]No   NHP  [] Yes  [x]No   Return to the Nursing Home  [] Yes  [x]No   Rehab Therapy  [] Yes  [x]No   Acute Rehab  [] Yes  [x]No   Subacute Rehab  [] Yes  [x]No   Private Care  [] Yes  [x]No   Substance Abuse Referral  [] Yes  [x]No   Transportation  [] Yes  [x]No   Chore Service  [] Yes  [x]No   Inpatient Hospice  [] Yes  [x]No   OP RT  [] Yes  [x] No   OP Hemo  [] Yes  [x] No   OP PT  [] Yes  [x]No   Support Group  [] Yes  [x]No   Reach to Recovery  [] Yes  [x]No   OP Oncology Clinic  [] Yes  [x]No   Clinic Appointment  [] Yes  [x]No   DME  [] Yes  [x]No   Comments    Name of D/C Planner or  Given to Patient or Family Jose Barrett RN BSN  Outcomes Manager  Pager # [phone]   Phone Number         Extension    Date 8/22/2017   Time 0930   If you are discharged home, whom do you designate to participate in your discharge plan and receive any information needed?      Enter name of designee spouse        Phone # of designee         Address of designee         Updated         Patient refused to designate any           individual

## 2017-08-22 NOTE — PROGRESS NOTES
1700 Received to room 3018 via stretcher. Patient to bathroom then back to bed. Heparin drip infusing per order. Placed on telemetry Box #18.  at bedside. 1930 Bedside and Verbal shift change report given to Claudette Couch (oncoming nurse) by Cait Bruce (offgoing nurse). Report included the following information SBAR, Kardex and MAR.

## 2017-08-22 NOTE — PROGRESS NOTES
conducted an initial consultation and Spiritual Assessment for [facility], who is a 74 y.o.,female. Patients Primary Language is: Shiva Cobb. According to the patients EMR Restorationist Affiliation is: No preference. The reason the Patient came to the hospital is:   Patient Active Problem List    Diagnosis Date Noted    Pulmonary embolism ([facility] [address] 08/21/2017    Hypothyroidism 08/21/2017    PE (pulmonary thromboembolism) ([facility] [address] 08/21/2017        The  provided the following Interventions:  Initiated a relationship of care and support. Explored issues of monique, spirituality and/or Restorationist needs while hospitalized. Listened empathically. Provided chaplaincy education. Provided information about Spiritual Care Services. Offered prayer and assurance of continued prayers on patient's behalf. Chart reviewed. The following outcomes were achieved:  Patient shared some information about their medical narrative and spiritual journey/beliefs. Patient processed feeling about current hospitalization. Patient expressed gratitude for the 's visit. Assessment:  Patient did not indicate any spiritual or Restorationist issues which require Spiritual Care Services interventions at this time. Patient does not have any Restorationist/cultural needs that will affect patients preferences in health care. Plan:  Chaplains will continue to follow and will provide pastoral care on an as needed or requested basis.  recommends bedside caregivers page  on duty if patient shows signs of acute spiritual or emotional distress.     [address] [facility]   Staff [address] [facility]   [phone]

## 2017-08-22 NOTE — CDMP QUERY
Please clarify if this patient is being treated/managed for:    =>Hypokalemia     =>Other Explanation of clinical findings  =>Unable to Determine (no explanation of clinical findings)    The medical record reflects the following:    Risk: 75 yo female with PMH PE, hypothyroidism    Clinical Indicators: On adm K+ 3.1    Treatment:  Klor-con, serial labs    Please clarify and document your clinical opinion in the progress notes and discharge summary including the definitive and/or presumptive diagnosis, (suspected or probable), related to the above clinical findings. Please include clinical findings supporting your diagnosis. If you DECLINE this query or would like to communicate with [facility], please utilize the \"iCabbi message box\" at the TOP of the Progress Note on the right.       Thank you,  Pooja Orozco RN [facility] [phone]

## 2017-08-22 NOTE — CONSULTS
PCCM  Consult  8/22/2017    Pt seen & d/w Dr Encarnacion. She was started on iv heparin based upon CT findings. Echo: n/a (ordered, not done)    CTA: I reviewed results & images    Bilat LEUS: no DVT    ECG:    Normal sinus rhythm   Minimal voltage criteria for LVH, may be normal variant   Abnormal ECG   No previous ECGs available   Confirmed by Andrew Colbert ([id number]) on 8/22/2017 6:06:31 AM     PEx:  A&A ND EOMI No neuro sns/sx  Neck no mass; + radiated M to carotrids  Lungs: normal clear  Cor: 2/6 hars SM base; not apex  Adb benign  Extr: soft symm no CCE; multiple SKs back two surgical scars LLE     IMPRESSION:  1. Pt with a possible small incidental PE noted on CTA that is not likely accountable for any of her presenting sx. It is certainly not responsible for any back pain, which she has had for years. LEUS are negative for DVT. Her sx are most suggestive of orthostasis, mild heat exhaustion. * No indication for systemic anticoagulation for PE.     2. Systolic M - rule out signif AS    * Echo was done     PLAN:  Available as needed.  Please call for any Q or concerns.  -flavio  [phone]

## 2021-11-12 ENCOUNTER — OFFICE VISIT (OUTPATIENT)
Dept: OTOLARYNGOLOGY | Facility: CLINIC | Age: 71
End: 2021-11-12
Payer: MEDICARE

## 2021-11-12 VITALS — WEIGHT: 100 LBS | BODY MASS INDEX: 20.16 KG/M2 | HEIGHT: 59 IN

## 2021-11-12 DIAGNOSIS — J32.9 CHRONIC SINUSITIS, UNSPECIFIED LOCATION: ICD-10-CM

## 2021-11-12 PROCEDURE — 99203 OFFICE O/P NEW LOW 30 MIN: CPT | Mod: PBBFAC | Performed by: OTOLARYNGOLOGY

## 2021-11-12 PROCEDURE — 99204 PR OFFICE/OUTPT VISIT, NEW, LEVL IV, 45-59 MIN: ICD-10-PCS | Mod: S$PBB,,, | Performed by: OTOLARYNGOLOGY

## 2021-11-12 PROCEDURE — 99204 OFFICE O/P NEW MOD 45 MIN: CPT | Mod: S$PBB,,, | Performed by: OTOLARYNGOLOGY

## 2021-11-22 ENCOUNTER — OFFICE VISIT (OUTPATIENT)
Dept: OTOLARYNGOLOGY | Facility: CLINIC | Age: 71
End: 2021-11-22
Payer: MEDICARE

## 2021-11-22 ENCOUNTER — HOSPITAL ENCOUNTER (OUTPATIENT)
Dept: RADIOLOGY | Facility: HOSPITAL | Age: 71
Discharge: HOME | End: 2021-11-22
Attending: OTOLARYNGOLOGY
Payer: MEDICARE

## 2021-11-22 VITALS — WEIGHT: 100 LBS | HEIGHT: 59 IN | BODY MASS INDEX: 20.16 KG/M2

## 2021-11-22 DIAGNOSIS — J32.9 CHRONIC SINUSITIS, UNSPECIFIED LOCATION: ICD-10-CM

## 2021-11-22 DIAGNOSIS — J32.9 CHRONIC SINUSITIS, UNSPECIFIED LOCATION: Primary | ICD-10-CM

## 2021-11-22 PROCEDURE — 70486 CT SINUSES WITHOUT CONTRAST: ICD-10-PCS | Mod: 26,,, | Performed by: RADIOLOGY

## 2021-11-22 PROCEDURE — 99213 OFFICE O/P EST LOW 20 MIN: CPT | Mod: PBBFAC,25 | Performed by: OTOLARYNGOLOGY

## 2021-11-22 PROCEDURE — 70486 CT MAXILLOFACIAL W/O DYE: CPT | Mod: 26,,, | Performed by: RADIOLOGY

## 2021-11-22 PROCEDURE — 99214 OFFICE O/P EST MOD 30 MIN: CPT | Mod: S$PBB,,, | Performed by: OTOLARYNGOLOGY

## 2021-11-22 PROCEDURE — 70486 CT MAXILLOFACIAL W/O DYE: CPT | Mod: TC

## 2021-11-22 PROCEDURE — 99214 PR OFFICE/OUTPT VISIT, EST, LEVL IV, 30-39 MIN: ICD-10-PCS | Mod: S$PBB,,, | Performed by: OTOLARYNGOLOGY

## 2022-01-20 ENCOUNTER — HOSPITAL ENCOUNTER (OUTPATIENT)
Dept: RADIOLOGY | Facility: HOSPITAL | Age: 72
Discharge: HOME | End: 2022-01-20
Attending: OBSTETRICS & GYNECOLOGY
Payer: MEDICARE

## 2022-01-20 VITALS — BODY MASS INDEX: 20.16 KG/M2 | WEIGHT: 100 LBS | HEIGHT: 59 IN

## 2022-01-20 DIAGNOSIS — Z12.31 VISIT FOR SCREENING MAMMOGRAM: ICD-10-CM

## 2022-01-20 DIAGNOSIS — M81.0 OSTEOPOROSIS: ICD-10-CM

## 2022-01-20 PROCEDURE — 77067 SCR MAMMO BI INCL CAD: CPT | Mod: TC

## 2022-01-20 PROCEDURE — 77080 DEXA BONE DENSITY SPINE HIP: ICD-10-PCS | Mod: 26,,, | Performed by: RADIOLOGY

## 2022-01-20 PROCEDURE — 77080 DXA BONE DENSITY AXIAL: CPT | Mod: TC

## 2022-01-20 PROCEDURE — 77080 DXA BONE DENSITY AXIAL: CPT | Mod: 26,,, | Performed by: RADIOLOGY

## 2022-04-19 ENCOUNTER — OFFICE VISIT (OUTPATIENT)
Dept: INTERNAL MEDICINE | Facility: CLINIC | Age: 72
End: 2022-04-19
Payer: MEDICARE

## 2022-04-19 VITALS
HEIGHT: 60 IN | HEART RATE: 90 BPM | SYSTOLIC BLOOD PRESSURE: 122 MMHG | OXYGEN SATURATION: 98 % | BODY MASS INDEX: 20.22 KG/M2 | RESPIRATION RATE: 16 BRPM | DIASTOLIC BLOOD PRESSURE: 68 MMHG | WEIGHT: 103 LBS

## 2022-04-19 DIAGNOSIS — C90.01 MULTIPLE MYELOMA IN REMISSION: ICD-10-CM

## 2022-04-19 DIAGNOSIS — Z86.73 HISTORY OF STROKE: ICD-10-CM

## 2022-04-19 DIAGNOSIS — M81.0 AGE-RELATED OSTEOPOROSIS WITHOUT CURRENT PATHOLOGICAL FRACTURE: Primary | ICD-10-CM

## 2022-04-19 DIAGNOSIS — E03.9 HYPOTHYROIDISM, UNSPECIFIED TYPE: ICD-10-CM

## 2022-04-19 PROBLEM — Z94.84 STEM CELLS TRANSPLANT STATUS: Status: ACTIVE | Noted: 2018-12-07

## 2022-04-19 PROBLEM — I63.9 CEREBROVASCULAR ACCIDENT: Status: ACTIVE | Noted: 2022-04-19

## 2022-04-19 PROCEDURE — 99214 OFFICE O/P EST MOD 30 MIN: CPT | Mod: S$PBB,,, | Performed by: NURSE PRACTITIONER

## 2022-04-19 PROCEDURE — 99214 PR OFFICE/OUTPT VISIT, EST, LEVL IV, 30-39 MIN: ICD-10-PCS | Mod: S$PBB,,, | Performed by: NURSE PRACTITIONER

## 2022-04-19 PROCEDURE — 99214 OFFICE O/P EST MOD 30 MIN: CPT | Mod: PBBFAC | Performed by: NURSE PRACTITIONER

## 2022-04-19 RX ORDER — NAPROXEN SODIUM 220 MG/1
81 TABLET, FILM COATED ORAL
COMMUNITY

## 2022-04-19 RX ORDER — ACYCLOVIR 400 MG/1
400 TABLET ORAL 2 TIMES DAILY
COMMUNITY
Start: 2022-01-10

## 2022-04-19 RX ORDER — ACETAMINOPHEN 500 MG
5000 TABLET ORAL
COMMUNITY

## 2022-04-19 RX ORDER — ROSUVASTATIN CALCIUM 20 MG/1
20 TABLET, COATED ORAL DAILY
COMMUNITY

## 2022-04-19 RX ORDER — ACETAMINOPHEN AND PHENYLEPHRINE HCL 325; 5 MG/1; MG/1
TABLET ORAL
COMMUNITY

## 2022-04-19 RX ORDER — POTASSIUM CHLORIDE 750 MG/1
20 CAPSULE, EXTENDED RELEASE ORAL 2 TIMES DAILY
COMMUNITY
Start: 2022-02-17

## 2022-04-19 RX ORDER — CLOPIDOGREL BISULFATE 75 MG/1
75 TABLET ORAL DAILY
COMMUNITY
Start: 2022-04-18

## 2022-04-19 RX ORDER — LEVOTHYROXINE SODIUM 137 UG/1
0.5 TABLET ORAL DAILY
COMMUNITY

## 2022-04-19 RX ORDER — DOCUSATE SODIUM 100 MG/1
CAPSULE, LIQUID FILLED ORAL
COMMUNITY

## 2022-04-19 NOTE — PROGRESS NOTES
HPI/CC  Ms. Dona Villarreal is a 72 year old  female referred for DXA scan and bone density consultation by Dr. Estella Steve. This is her first DXA scan. Her PCP is Dr. Chelsey Killian.  She is treated for hypothyroidism and hyperlipidemia.   She has been treated for multiple myeloma, which is in remission following stem cell transplant.   Her oncology providers are Dr. Alicea at [facility] and Dr. Portillo at the [facility] at [facility].    DXA Scan  I reviewed the images and report of DXA scan performed 2/2/2022 at [facility].  T-scores:  Spine -3.4;  Femoral neck -3.4   In range of osteoporosis  Fracture History  Denies fractures in adulthood.    Calcium/Vit D  Taking 5000 IU Vit D daily and consumes various dairy products  Weight bearing exercise No restrictions on activity  Dental status:  Sees dentist on regular basis.  No major dental procedures are anticipated    Risk factors for osteoporosis/fracture  Positive for  Postmenopausal  female  (no hysterectomy)  Small frame; low body weight  Mother had osteoporosis and fracture of femur (mid thigh)  Long term use of corticosteroids    Negative for  Never smoked  Does not drink alcohol  No history of rheumatoid arthritis    Review of Systems   Constitutional: Negative for fever, chills, malaise.    Respiratory: Negative for cough and shortness of breath.    Cardiovascular: Negative for chest pain and palpitations.   Gastrointestinal: Negative for change in bowel habit, constipation   Neurological: Negative for dizziness, syncope, weakness and light-headedness.   Musculoskeletal:  Requires no assistive devices for ambulation    Physical Exam  Constitutional:       Appearance:In no distress  HENT:      Mouth/Throat:      Mouth: Mucous membranes are moist. Has partial plate  Eyes:      Conjunctiva/sclera: Conjunctivae normal. Pupils equal  Cardiovascular:      Rate and Rhythm: Normal  rate and regular rhythm.   Pulmonary:      Effort: Pulmonary effort is normal.   Musculoskeletal:      Cervical spine: Normal range of motion.      Thoracic spine:  Normal range of motion.  No kyphosis     Lumbar spine: Normal range of motion. No scoliosis.   Skin:     General: Skin is warm and dry.   Neurological:      Mental Status: She is alert and oriented to person, place, and time.     Assessment:       1. Osteoporosis    2.      Multiple myeloma  3.      Hypothyroidism  4.      History of stroke  Plan:    1.  Will communicate with oncologists (at [facility] and [facility]) regarding DXA scan report and possible treatment.  Reviewed encounter notes of Dr. Hodge at [facility].   2.  Reviewed results of DXA scan with pt.  Reviewed personal risk factors for osteoporosis and fracture.   3.  Offered overview of disease process of osteoporosis and role of calcium, Vit D, and weight bearing exercise in bone health.   4.  Informed pt that one of the possible treatments is denosumab.  Reviewed major potential adverse effects.   5.  Lab:  PTH, Vit D   6.  Return one month   7.  Recommend two Citracal with D Petites daily.  Will contact pt if Vit D level indicates need for change in supplement dose of Vit D

## 2022-05-11 DIAGNOSIS — M81.0 AGE-RELATED OSTEOPOROSIS WITHOUT CURRENT PATHOLOGICAL FRACTURE: Primary | ICD-10-CM

## 2022-06-29 ENCOUNTER — OFFICE VISIT (OUTPATIENT)
Dept: INTERNAL MEDICINE | Facility: CLINIC | Age: 72
End: 2022-06-29
Payer: MEDICARE

## 2022-06-29 VITALS
SYSTOLIC BLOOD PRESSURE: 122 MMHG | HEART RATE: 68 BPM | OXYGEN SATURATION: 98 % | DIASTOLIC BLOOD PRESSURE: 68 MMHG | BODY MASS INDEX: 20.84 KG/M2 | WEIGHT: 103.38 LBS | HEIGHT: 59 IN

## 2022-06-29 DIAGNOSIS — M81.0 AGE-RELATED OSTEOPOROSIS WITHOUT CURRENT PATHOLOGICAL FRACTURE: Primary | ICD-10-CM

## 2022-06-29 PROCEDURE — 99214 PR OFFICE/OUTPT VISIT, EST, LEVL IV, 30-39 MIN: ICD-10-PCS | Mod: S$PBB,,, | Performed by: NURSE PRACTITIONER

## 2022-06-29 PROCEDURE — 99214 OFFICE O/P EST MOD 30 MIN: CPT | Mod: PBBFAC | Performed by: NURSE PRACTITIONER

## 2022-06-29 PROCEDURE — 99214 OFFICE O/P EST MOD 30 MIN: CPT | Mod: S$PBB,,, | Performed by: NURSE PRACTITIONER

## 2022-06-29 NOTE — PROGRESS NOTES
HPI/CC  Ms. Dona Villarreal is a 72 year old  female referred for DXA scan and bone density consultation by Dr. Estella Steve. Her PCP is Dr. Chelsey Killian.  She is treated for hypothyroidism and hyperlipidemia.   She has been treated for multiple myeloma, which is in remission following stem cell transplant.   Her oncology providers are Dr. Alicea at [facility] and Dr. Portillo at the [facility] at [facility]. Recent visit to providers at [facility].  Labs Reviewed:  PTH 54.5; Vit D 74.1  DXA Scan  DXA scan performed 2/2/2022 at [facility].  T-scores:  Spine -3.4;  Femoral neck -3.4   In range of osteoporosis  Fracture History  Denies fractures in adulthood.  States mother had osteoporosis, severe kyphosis, and femur fracture  Calcium/Vit D  Taking 5000 IU Vit D daily and consumes various dairy products  Weight bearing exercise No restrictions on activity  Dental status:  Sees dentist on regular basis.  No major dental procedures are anticipated     Risk factors for osteoporosis/fracture  Positive for  Postmenopausal  female  (no hysterectomy)  Small frame; low body weight  Mother had osteoporosis and fracture of femur (mid thigh)  Long term use of corticosteroids     Negative for  Never smoked  Does not drink alcohol  No history of rheumatoid arthritis     Review of Systems   Constitutional: Negative for fever, chills, malaise.    Respiratory: Negative for cough and shortness of breath.    Cardiovascular: Negative for chest pain and palpitations.   Gastrointestinal: Negative for change in bowel habit, constipation   Neurological: Negative for dizziness, syncope, weakness and light-headedness.   Musculoskeletal:  Requires no assistive devices for ambulation  Dental:  No dental issues; has appt with dentist for preventive visit this month.  Physical Exam  Constitutional:       Appearance:In no distress  HENT:       Mouth/Throat:      Mouth: Mucous membranes are moist. Has partial plate  Eyes:      Conjunctiva/sclera: Conjunctivae normal. Pupils equal  Cardiovascular:      Rate and Rhythm: Normal rate and regular rhythm.   Pulmonary:      Effort: Pulmonary effort is normal.   Musculoskeletal:      Cervical spine: Normal range of motion.      Thoracic spine:  Normal range of motion.  No kyphosis     Lumbar spine: Normal range of motion. No scoliosis.   Skin:     General: Skin is warm and dry.   Neurological:      Mental Status: She is alert and oriented to person, place, and time.      Assessment:       1. Osteoporosis    2.      Multiple myeloma  3.      Hypothyroidism  4.      History of stroke  Plan:    1.  I spoke with oncologists (at [facility] and [facility]) regarding DXA scan report and possible treatment.  Reviewed encounter notes of Dr. Hodge at [facility]. NP who works with Dr. Hodge re: initiation of Prolia; she states Dr. Hodge did not voice concern about pt initiating Prolia  2.  Reviewed expected benefits and potential serious adverse effects of Prolia with pt.  Provided printed literature on disease process, body mechanics, and Prolia.  Pt has decided to start Prolia  3.  Lab order:  Serum Calcium within 2 weeks of Prolia  4. PA Prolia

## 2022-08-03 ENCOUNTER — CLINICAL SUPPORT (OUTPATIENT)
Dept: INTERNAL MEDICINE | Facility: CLINIC | Age: 72
End: 2022-08-03
Payer: MEDICARE

## 2022-08-03 DIAGNOSIS — M81.0 AGE-RELATED OSTEOPOROSIS WITHOUT CURRENT PATHOLOGICAL FRACTURE: Primary | ICD-10-CM

## 2022-08-03 PROCEDURE — 99212 OFFICE O/P EST SF 10 MIN: CPT | Mod: PBBFAC

## 2022-08-03 PROCEDURE — 96372 THER/PROPH/DIAG INJ SC/IM: CPT | Mod: PBBFAC | Performed by: NURSE PRACTITIONER

## 2022-08-03 RX ADMIN — DENOSUMAB 60 MG: 60 INJECTION SUBCUTANEOUS at 01:08

## 2023-02-15 ENCOUNTER — CLINICAL SUPPORT (OUTPATIENT)
Dept: INTERNAL MEDICINE | Facility: CLINIC | Age: 73
End: 2023-02-15
Payer: MEDICARE

## 2023-02-15 DIAGNOSIS — M81.0 AGE-RELATED OSTEOPOROSIS WITHOUT CURRENT PATHOLOGICAL FRACTURE: Primary | ICD-10-CM

## 2023-02-15 PROCEDURE — 96372 THER/PROPH/DIAG INJ SC/IM: CPT | Mod: PBBFAC | Performed by: NURSE PRACTITIONER

## 2023-02-15 PROCEDURE — 99213 OFFICE O/P EST LOW 20 MIN: CPT | Mod: PBBFAC

## 2023-02-15 RX ADMIN — DENOSUMAB 60 MG: 60 INJECTION SUBCUTANEOUS at 08:02

## 2023-05-31 ENCOUNTER — HOSPITAL ENCOUNTER (OUTPATIENT)
Dept: RADIOLOGY | Facility: HOSPITAL | Age: 73
Discharge: HOME | End: 2023-05-31
Payer: MEDICARE

## 2023-05-31 VITALS — WEIGHT: 103 LBS | BODY MASS INDEX: 20.76 KG/M2 | HEIGHT: 59 IN

## 2023-05-31 DIAGNOSIS — Z12.31 BREAST CANCER SCREENING BY MAMMOGRAM: ICD-10-CM

## 2023-05-31 PROCEDURE — 77067 SCR MAMMO BI INCL CAD: CPT | Mod: TC

## 2023-08-22 NOTE — PROGRESS NOTES
HPI/CC  Ms. Dona Villarreal is a 73 year old  female referred for DXA scan and bone density consultation by Dr. Estella Steve. Her PCP is Dr. Chelsey Killian.  She is treated for hypothyroidism and hyperlipidemia.   She has been treated for multiple myeloma, which is in remission following stem cell transplant.   Her oncology providers are Dr. Alicea at [facility] and Dr. Portillo at the [facility] at [facility].   Patient denies any known signs/symptoms of adverse effects of Prolia.    Labs Reviewed:  Vit D 4/19/22 74.1;         Calcium  8/8/2023  10.2    DXA Scan  DXA scan performed 2/2/2022 at [facility].  T-scores:  Spine -3.4;  Femoral neck -3.4   In range of osteoporosis    Fracture History  Denies fractures in adulthood.  States mother had osteoporosis, severe kyphosis, and femur fracture    Calcium/Vit D  Taking 5000 IU Vit D daily and consumes various dairy products    Weight bearing exercise No restrictions on activity    Dental status:  Sees dentist on regular basis.  No major dental procedures are anticipated     Risk factors for osteoporosis/fracture  Positive for  Postmenopausal  female  (no hysterectomy)  Small frame; low body weight  Mother had osteoporosis and fracture of femur (mid thigh)  Long term use of corticosteroids     Negative for  Never smoked  Does not drink alcohol  No history of rheumatoid arthritis     Review of Systems   Constitutional: Negative for fever, chills, malaise.    Respiratory: Negative for cough and shortness of breath.    Cardiovascular: Negative for chest pain and palpitations.   Gastrointestinal: Negative for change in bowel habit, constipation   Neurological: Negative for dizziness, syncope, weakness and light-headedness.   Musculoskeletal:  Requires no assistive devices for ambulation  Dental:  No dental issues; has appt with dentist for preventive visit this month.    Physical  Exam  Constitutional:       Appearance:In no distress  HENT:      Mouth/Throat:      Mouth: Mucous membranes are moist. Has partial plate  Eyes:      Conjunctiva/sclera: Conjunctivae normal. Pupils equal  Cardiovascular:      Rate and Rhythm: Normal rate and regular rhythm.   Pulmonary:      Effort: Pulmonary effort is normal.   Musculoskeletal:      Cervical spine: Normal range of motion.      Thoracic spine:  Normal range of motion.  No kyphosis     Lumbar spine: Normal range of motion. No scoliosis.   Skin:     General: Skin is warm and dry.   Neurological:      Mental Status: She is alert and oriented to person, place, and time.      Assessment:       1. Osteoporosis    2.      Multiple myeloma  3.      Hypothyroidism  4.      History of stroke  Plan:    1.  I have previously spoken with oncologists (at [facility] and [facility]) regarding DXA scan report and possible treatment.  Reviewed encounter notes of Dr. Hodge at [facility]. NP who works with Dr. Hodge re: initiation of Prolia; she states Dr. Hodge did not voice concern about pt initiating Prolia  2.  Reviewed expected benefits and possible serious side effects of Prolia.  Possible serious side effects include low calcium, severe jaw bone problems, unusual thigh bone fractures, serious infections, and skin problems.  Printed literature provided. Informed that calcium will be checked within 2 weeks of each Prolia injection.   3.  Prolia injection today  4.  Lab order:  Serum Calcium and Vit D within 2 weeks of Prolia  5.  PA Prolia  6.  Repeat DXA scan  2/2024

## 2023-08-23 ENCOUNTER — OFFICE VISIT (OUTPATIENT)
Dept: INTERNAL MEDICINE | Facility: CLINIC | Age: 73
End: 2023-08-23
Payer: MEDICARE

## 2023-08-23 VITALS
OXYGEN SATURATION: 99 % | RESPIRATION RATE: 16 BRPM | HEART RATE: 77 BPM | DIASTOLIC BLOOD PRESSURE: 60 MMHG | SYSTOLIC BLOOD PRESSURE: 128 MMHG

## 2023-08-23 DIAGNOSIS — E03.9 HYPOTHYROIDISM, UNSPECIFIED TYPE: ICD-10-CM

## 2023-08-23 DIAGNOSIS — M81.0 AGE-RELATED OSTEOPOROSIS WITHOUT CURRENT PATHOLOGICAL FRACTURE: Primary | ICD-10-CM

## 2023-08-23 DIAGNOSIS — Z86.73 HISTORY OF STROKE: ICD-10-CM

## 2023-08-23 DIAGNOSIS — C90.01 MULTIPLE MYELOMA IN REMISSION: ICD-10-CM

## 2023-08-23 PROCEDURE — 99214 OFFICE O/P EST MOD 30 MIN: CPT | Mod: PBBFAC,25 | Performed by: NURSE PRACTITIONER

## 2023-08-23 PROCEDURE — 99999PBSHW PR PBB SHADOW TECHNICAL ONLY FILED TO HB: ICD-10-PCS | Mod: JZ,PBBFAC,,

## 2023-08-23 PROCEDURE — 96372 THER/PROPH/DIAG INJ SC/IM: CPT | Mod: PBBFAC | Performed by: NURSE PRACTITIONER

## 2023-08-23 PROCEDURE — 99213 OFFICE O/P EST LOW 20 MIN: CPT | Mod: S$PBB,25,, | Performed by: NURSE PRACTITIONER

## 2023-08-23 PROCEDURE — 99999PBSHW PR PBB SHADOW TECHNICAL ONLY FILED TO HB: Mod: JZ,PBBFAC,,

## 2023-08-23 PROCEDURE — 99213 PR OFFICE/OUTPT VISIT, EST, LEVL III, 20-29 MIN: ICD-10-PCS | Mod: S$PBB,25,, | Performed by: NURSE PRACTITIONER

## 2023-08-23 RX ADMIN — DENOSUMAB 60 MG: 60 INJECTION SUBCUTANEOUS at 10:08

## 2023-12-15 ENCOUNTER — ANESTHESIA EVENT (OUTPATIENT)
Dept: SURGERY | Facility: HOSPITAL | Age: 73
End: 2023-12-15
Payer: MEDICARE

## 2023-12-15 ENCOUNTER — ANESTHESIA (OUTPATIENT)
Dept: SURGERY | Facility: HOSPITAL | Age: 73
End: 2023-12-15
Payer: MEDICARE

## 2023-12-15 ENCOUNTER — HOSPITAL ENCOUNTER (OUTPATIENT)
Facility: HOSPITAL | Age: 73
Discharge: HOME | End: 2023-12-15
Attending: OBSTETRICS & GYNECOLOGY | Admitting: OBSTETRICS & GYNECOLOGY
Payer: MEDICARE

## 2023-12-15 VITALS
WEIGHT: 106 LBS | HEIGHT: 59 IN | BODY MASS INDEX: 21.37 KG/M2 | OXYGEN SATURATION: 99 % | TEMPERATURE: 98 F | HEART RATE: 98 BPM | RESPIRATION RATE: 18 BRPM | SYSTOLIC BLOOD PRESSURE: 152 MMHG | DIASTOLIC BLOOD PRESSURE: 64 MMHG

## 2023-12-15 DIAGNOSIS — R10.2 PELVIC PAIN IN FEMALE: ICD-10-CM

## 2023-12-15 DIAGNOSIS — N93.9 ABNORMAL UTERINE BLEEDING (AUB): ICD-10-CM

## 2023-12-15 DIAGNOSIS — R10.9 RIGHT SIDED ABDOMINAL PAIN: ICD-10-CM

## 2023-12-15 PROCEDURE — 36000708 HC OR TIME LEV III 1ST 15 MIN: Performed by: OBSTETRICS & GYNECOLOGY

## 2023-12-15 PROCEDURE — 25000003 PHARM REV CODE 250: Performed by: NURSE ANESTHETIST, CERTIFIED REGISTERED

## 2023-12-15 PROCEDURE — 25000003 PHARM REV CODE 250: Performed by: ANESTHESIOLOGY

## 2023-12-15 PROCEDURE — 88305 SURGICAL PATHOLOGY: ICD-10-PCS | Mod: 26,,, | Performed by: PATHOLOGY

## 2023-12-15 PROCEDURE — 63600175 PHARM REV CODE 636 W HCPCS: Performed by: ANESTHESIOLOGY

## 2023-12-15 PROCEDURE — D9220A PRA ANESTHESIA: Mod: CRNA,,, | Performed by: NURSE ANESTHETIST, CERTIFIED REGISTERED

## 2023-12-15 PROCEDURE — 71000016 HC POSTOP RECOV ADDL HR: Performed by: OBSTETRICS & GYNECOLOGY

## 2023-12-15 PROCEDURE — 27201423 OPTIME MED/SURG SUP & DEVICES STERILE SUPPLY: Performed by: OBSTETRICS & GYNECOLOGY

## 2023-12-15 PROCEDURE — 71000015 HC POSTOP RECOV 1ST HR: Performed by: OBSTETRICS & GYNECOLOGY

## 2023-12-15 PROCEDURE — 63600175 PHARM REV CODE 636 W HCPCS: Performed by: NURSE ANESTHETIST, CERTIFIED REGISTERED

## 2023-12-15 PROCEDURE — 37000008 HC ANESTHESIA 1ST 15 MINUTES: Performed by: OBSTETRICS & GYNECOLOGY

## 2023-12-15 PROCEDURE — D9220A PRA ANESTHESIA: ICD-10-PCS | Mod: ANES,,, | Performed by: ANESTHESIOLOGY

## 2023-12-15 PROCEDURE — 25000003 PHARM REV CODE 250: Performed by: OBSTETRICS & GYNECOLOGY

## 2023-12-15 PROCEDURE — 88305 TISSUE EXAM BY PATHOLOGIST: CPT | Mod: TC,SUR | Performed by: OBSTETRICS & GYNECOLOGY

## 2023-12-15 PROCEDURE — D9220A PRA ANESTHESIA: ICD-10-PCS | Mod: CRNA,,, | Performed by: NURSE ANESTHETIST, CERTIFIED REGISTERED

## 2023-12-15 PROCEDURE — 37000009 HC ANESTHESIA EA ADD 15 MINS: Performed by: OBSTETRICS & GYNECOLOGY

## 2023-12-15 PROCEDURE — D9220A PRA ANESTHESIA: Mod: ANES,,, | Performed by: ANESTHESIOLOGY

## 2023-12-15 PROCEDURE — 71000033 HC RECOVERY, INTIAL HOUR: Performed by: OBSTETRICS & GYNECOLOGY

## 2023-12-15 PROCEDURE — 36000709 HC OR TIME LEV III EA ADD 15 MIN: Performed by: OBSTETRICS & GYNECOLOGY

## 2023-12-15 PROCEDURE — 88305 TISSUE EXAM BY PATHOLOGIST: CPT | Mod: 26,,, | Performed by: PATHOLOGY

## 2023-12-15 RX ORDER — EPHEDRINE SULFATE 50 MG/ML
INJECTION, SOLUTION INTRAVENOUS
Status: DISCONTINUED | OUTPATIENT
Start: 2023-12-15 | End: 2023-12-15

## 2023-12-15 RX ORDER — MORPHINE SULFATE 10 MG/ML
4 INJECTION INTRAMUSCULAR; INTRAVENOUS; SUBCUTANEOUS EVERY 5 MIN PRN
Status: DISCONTINUED | OUTPATIENT
Start: 2023-12-15 | End: 2023-12-15 | Stop reason: HOSPADM

## 2023-12-15 RX ORDER — PROPOFOL 10 MG/ML
VIAL (ML) INTRAVENOUS
Status: DISCONTINUED | OUTPATIENT
Start: 2023-12-15 | End: 2023-12-15

## 2023-12-15 RX ORDER — ROCURONIUM BROMIDE 10 MG/ML
INJECTION, SOLUTION INTRAVENOUS
Status: DISCONTINUED | OUTPATIENT
Start: 2023-12-15 | End: 2023-12-15

## 2023-12-15 RX ORDER — OXYCODONE HYDROCHLORIDE 5 MG/1
5 TABLET ORAL
Status: DISCONTINUED | OUTPATIENT
Start: 2023-12-15 | End: 2023-12-15 | Stop reason: HOSPADM

## 2023-12-15 RX ORDER — ONDANSETRON 4 MG/1
8 TABLET, ORALLY DISINTEGRATING ORAL EVERY 8 HOURS PRN
Status: DISCONTINUED | OUTPATIENT
Start: 2023-12-15 | End: 2023-12-15 | Stop reason: HOSPADM

## 2023-12-15 RX ORDER — CEFAZOLIN SODIUM 1 G/3ML
INJECTION, POWDER, FOR SOLUTION INTRAMUSCULAR; INTRAVENOUS
Status: DISCONTINUED | OUTPATIENT
Start: 2023-12-15 | End: 2023-12-15

## 2023-12-15 RX ORDER — HYDROMORPHONE HYDROCHLORIDE 2 MG/ML
INJECTION, SOLUTION INTRAMUSCULAR; INTRAVENOUS; SUBCUTANEOUS
Status: DISCONTINUED | OUTPATIENT
Start: 2023-12-15 | End: 2023-12-15

## 2023-12-15 RX ORDER — DIPHENHYDRAMINE HCL 25 MG
25 CAPSULE ORAL EVERY 4 HOURS PRN
Status: DISCONTINUED | OUTPATIENT
Start: 2023-12-15 | End: 2023-12-15 | Stop reason: HOSPADM

## 2023-12-15 RX ORDER — HYDROMORPHONE HYDROCHLORIDE 2 MG/ML
0.5 INJECTION, SOLUTION INTRAMUSCULAR; INTRAVENOUS; SUBCUTANEOUS EVERY 5 MIN PRN
Status: DISCONTINUED | OUTPATIENT
Start: 2023-12-15 | End: 2023-12-15 | Stop reason: HOSPADM

## 2023-12-15 RX ORDER — DEXAMETHASONE SODIUM PHOSPHATE 4 MG/ML
INJECTION, SOLUTION INTRA-ARTICULAR; INTRALESIONAL; INTRAMUSCULAR; INTRAVENOUS; SOFT TISSUE
Status: DISCONTINUED | OUTPATIENT
Start: 2023-12-15 | End: 2023-12-15

## 2023-12-15 RX ORDER — DIAZEPAM 5 MG/1
10 TABLET ORAL ONCE
Status: COMPLETED | OUTPATIENT
Start: 2023-12-15 | End: 2023-12-15

## 2023-12-15 RX ORDER — ONDANSETRON 2 MG/ML
4 INJECTION INTRAMUSCULAR; INTRAVENOUS DAILY PRN
Status: DISCONTINUED | OUTPATIENT
Start: 2023-12-15 | End: 2023-12-15 | Stop reason: HOSPADM

## 2023-12-15 RX ORDER — MEPERIDINE HYDROCHLORIDE 25 MG/ML
25 INJECTION INTRAMUSCULAR; INTRAVENOUS; SUBCUTANEOUS EVERY 10 MIN PRN
Status: DISCONTINUED | OUTPATIENT
Start: 2023-12-15 | End: 2023-12-15 | Stop reason: HOSPADM

## 2023-12-15 RX ORDER — SODIUM CHLORIDE, SODIUM LACTATE, POTASSIUM CHLORIDE, CALCIUM CHLORIDE 600; 310; 30; 20 MG/100ML; MG/100ML; MG/100ML; MG/100ML
INJECTION, SOLUTION INTRAVENOUS CONTINUOUS
Status: DISCONTINUED | OUTPATIENT
Start: 2023-12-15 | End: 2023-12-15 | Stop reason: HOSPADM

## 2023-12-15 RX ORDER — DIAZEPAM 5 MG/1
10 TABLET ORAL EVERY 6 HOURS PRN
Status: CANCELLED | OUTPATIENT
Start: 2023-12-15

## 2023-12-15 RX ORDER — ONDANSETRON 2 MG/ML
INJECTION INTRAMUSCULAR; INTRAVENOUS
Status: DISCONTINUED | OUTPATIENT
Start: 2023-12-15 | End: 2023-12-15

## 2023-12-15 RX ORDER — SODIUM CHLORIDE, SODIUM LACTATE, POTASSIUM CHLORIDE, CALCIUM CHLORIDE 600; 310; 30; 20 MG/100ML; MG/100ML; MG/100ML; MG/100ML
125 INJECTION, SOLUTION INTRAVENOUS CONTINUOUS
Status: DISCONTINUED | OUTPATIENT
Start: 2023-12-15 | End: 2023-12-15 | Stop reason: HOSPADM

## 2023-12-15 RX ORDER — PROCHLORPERAZINE EDISYLATE 5 MG/ML
5 INJECTION INTRAMUSCULAR; INTRAVENOUS EVERY 6 HOURS PRN
Status: DISCONTINUED | OUTPATIENT
Start: 2023-12-15 | End: 2023-12-15 | Stop reason: HOSPADM

## 2023-12-15 RX ORDER — DIPHENHYDRAMINE HYDROCHLORIDE 50 MG/ML
INJECTION INTRAMUSCULAR; INTRAVENOUS
Status: DISCONTINUED | OUTPATIENT
Start: 2023-12-15 | End: 2023-12-15

## 2023-12-15 RX ORDER — DIPHENHYDRAMINE HYDROCHLORIDE 50 MG/ML
25 INJECTION INTRAMUSCULAR; INTRAVENOUS EVERY 6 HOURS PRN
Status: DISCONTINUED | OUTPATIENT
Start: 2023-12-15 | End: 2023-12-15 | Stop reason: HOSPADM

## 2023-12-15 RX ORDER — LIDOCAINE HYDROCHLORIDE 20 MG/ML
INJECTION, SOLUTION EPIDURAL; INFILTRATION; INTRACAUDAL; PERINEURAL
Status: DISCONTINUED | OUTPATIENT
Start: 2023-12-15 | End: 2023-12-15

## 2023-12-15 RX ORDER — SODIUM CHLORIDE 9 MG/ML
INJECTION, SOLUTION INTRAVENOUS CONTINUOUS
Status: DISCONTINUED | OUTPATIENT
Start: 2023-12-15 | End: 2023-12-15 | Stop reason: HOSPADM

## 2023-12-15 RX ORDER — DIPHENHYDRAMINE HYDROCHLORIDE 50 MG/ML
25 INJECTION INTRAMUSCULAR; INTRAVENOUS EVERY 4 HOURS PRN
Status: DISCONTINUED | OUTPATIENT
Start: 2023-12-15 | End: 2023-12-15 | Stop reason: HOSPADM

## 2023-12-15 RX ORDER — LIDOCAINE HYDROCHLORIDE 10 MG/ML
1 INJECTION INFILTRATION; PERINEURAL ONCE
Status: DISCONTINUED | OUTPATIENT
Start: 2023-12-15 | End: 2023-12-15 | Stop reason: HOSPADM

## 2023-12-15 RX ORDER — LIDOCAINE HYDROCHLORIDE 10 MG/ML
1 INJECTION INFILTRATION; PERINEURAL ONCE AS NEEDED
Status: DISCONTINUED | OUTPATIENT
Start: 2023-12-15 | End: 2023-12-15 | Stop reason: HOSPADM

## 2023-12-15 RX ORDER — HYDROCODONE BITARTRATE AND ACETAMINOPHEN 5; 325 MG/1; MG/1
1 TABLET ORAL EVERY 4 HOURS PRN
Status: DISCONTINUED | OUTPATIENT
Start: 2023-12-15 | End: 2023-12-15 | Stop reason: HOSPADM

## 2023-12-15 RX ADMIN — Medication 100 MG: at 09:12

## 2023-12-15 RX ADMIN — SODIUM CHLORIDE, POTASSIUM CHLORIDE, SODIUM LACTATE AND CALCIUM CHLORIDE: 600; 310; 30; 20 INJECTION, SOLUTION INTRAVENOUS at 09:12

## 2023-12-15 RX ADMIN — SUGAMMADEX 200 MG: 100 INJECTION, SOLUTION INTRAVENOUS at 11:12

## 2023-12-15 RX ADMIN — DIAZEPAM 10 MG: 5 TABLET ORAL at 08:12

## 2023-12-15 RX ADMIN — HYDROCODONE BITARTRATE AND ACETAMINOPHEN 1 TABLET: 5; 325 TABLET ORAL at 01:12

## 2023-12-15 RX ADMIN — ROCURONIUM BROMIDE 30 MG: 10 INJECTION, SOLUTION INTRAVENOUS at 09:12

## 2023-12-15 RX ADMIN — ONDANSETRON 4 MG: 2 INJECTION INTRAMUSCULAR; INTRAVENOUS at 09:12

## 2023-12-15 RX ADMIN — DIPHENHYDRAMINE HYDROCHLORIDE 6.25 MG: 50 INJECTION INTRAMUSCULAR; INTRAVENOUS at 10:12

## 2023-12-15 RX ADMIN — MEPERIDINE HYDROCHLORIDE 25 MG: 25 INJECTION INTRAMUSCULAR; INTRAVENOUS; SUBCUTANEOUS at 12:12

## 2023-12-15 RX ADMIN — CEFAZOLIN 2 G: 1 INJECTION, POWDER, FOR SOLUTION INTRAMUSCULAR; INTRAVENOUS; PARENTERAL at 10:12

## 2023-12-15 RX ADMIN — LIDOCAINE HYDROCHLORIDE 60 MG: 20 INJECTION, SOLUTION INTRAVENOUS at 09:12

## 2023-12-15 RX ADMIN — HYDROMORPHONE HYDROCHLORIDE 0.4 MG: 2 INJECTION, SOLUTION INTRAMUSCULAR; INTRAVENOUS; SUBCUTANEOUS at 10:12

## 2023-12-15 RX ADMIN — EPHEDRINE SULFATE 10 MG: 50 INJECTION INTRAVENOUS at 11:12

## 2023-12-15 RX ADMIN — Medication 50 MG: at 10:12

## 2023-12-15 RX ADMIN — DEXAMETHASONE SODIUM PHOSPHATE 12 MG: 4 INJECTION, SOLUTION INTRA-ARTICULAR; INTRALESIONAL; INTRAMUSCULAR; INTRAVENOUS; SOFT TISSUE at 10:12

## 2023-12-15 NOTE — DISCHARGE SUMMARY
[facility] - Periop Services  Discharge Note  Short Stay    Procedure(s) (LRB):  HYSTEROSCOPY, WITH DILATION AND CURETTAGE OF UTERUS AND NATALIE ENDOMETRIAL ABLATION (N/A)      OUTCOME: Condition has improved and patient is now ready for discharge.    DISPOSITION: Home or Self Care    FINAL DIAGNOSIS:  Right sided abdominal pain    FOLLOWUP: In clinic    DISCHARGE INSTRUCTIONS:  No discharge procedures on file.     TIME SPENT ON DISCHARGE: 10 minutes  Prescriptions  were written and all questions answered     Percocet 5/325 mg  written

## 2023-12-15 NOTE — ANESTHESIA RELEASE NOTE
"Anesthesia Release from PACU Note    Patient: Dona Villarreal    Procedure(s) Performed: Procedure(s) (LRB):  HYSTEROSCOPY, WITH DILATION AND CURETTAGE OF UTERUS AND NATALIE ENDOMETRIAL ABLATION (N/A)  SALPINGO-OOPHORECTOMY, LAPAROSCOPIC (Bilateral)    Anesthesia type: general    Post pain: Adequate analgesia    Post assessment: no apparent anesthetic complications    Last Vitals: Visit Vitals  BP (!) 146/63 (BP Location: Right arm, Patient Position: Lying)   Pulse 80   Temp 36.8 °C (98.2 °F) (Oral)   Resp 18   Ht 4' 11" (1.499 m)   Wt 48.1 kg (106 lb)   SpO2 97%   Breastfeeding No   BMI 21.41 kg/m²       Post vital signs: stable    Level of consciousness: sedated    Nausea/Vomiting: no nausea/no vomiting    Complications: none    Airway Patency: patent    Respiratory: unassisted    Cardiovascular: stable and blood pressure at baseline    Hydration: euvolemic  "

## 2023-12-15 NOTE — ANESTHESIA POSTPROCEDURE EVALUATION
Anesthesia Post Evaluation    Patient: Dona Villarreal    Procedure(s) Performed: Procedure(s) (LRB):  HYSTEROSCOPY, WITH DILATION AND CURETTAGE OF UTERUS AND NATALIE ENDOMETRIAL ABLATION (N/A)  SALPINGO-OOPHORECTOMY, LAPAROSCOPIC (Bilateral)    Final Anesthesia Type: general      Patient location during evaluation: PACU  Patient participation: Yes- Able to Participate  Level of consciousness: awake and sedated  Post-procedure vital signs: reviewed and stable  Pain management: adequate  Airway patency: patent    PONV status at discharge: No PONV  Anesthetic complications: no      Cardiovascular status: blood pressure returned to baseline  Respiratory status: unassisted  Hydration status: euvolemic  Follow-up not needed.              Vitals Value Taken Time   /64 12/15/23 1331   Temp 36.4 °C (97.6 °F) 12/15/23 1139   Pulse 103 12/15/23 1332   Resp 18 12/15/23 1343   SpO2 98 % 12/15/23 1332   Vitals shown include unvalidated device data.      Event Time   Out of Recovery 12:20:00         Pain/Steff Score: Pain Rating Prior to Med Admin: 9 (12/15/2023  1:43 PM)  Steff Score: 10 (12/15/2023  2:00 PM)  Modified Steff Score: 19 (12/15/2023  2:00 PM)

## 2023-12-15 NOTE — ANESTHESIA PROCEDURE NOTES
Intubation    Date/Time: 12/15/2023 10:05 AM    Performed by: Salma Rea CRNA  Authorized by: Leonardo James MD    Intubation:     Induction:  Intravenous    Intubated:  Postinduction    Mask Ventilation:  Easy mask    Attempts:  1    Attempted By:  CRNA    Method of Intubation:  Direct and video laryngoscopy    Blade:  Glidescope 3    Laryngeal View Grade: Grade I - full view of cords      Difficult Airway Encountered?: No      Complications:  None    Airway Device:  Oral endotracheal tube    Airway Device Size:  7.0    Style/Cuff Inflation:  Cuffed (inflated to minimal occlusive pressure)    Inflation Amount (mL):  7    Tube secured:  21    Placement Verified By:  Capnometry    Complicating Factors:  None    Findings Post-Intubation:  BS equal bilateral and atraumatic/condition of teeth unchanged

## 2023-12-15 NOTE — OR NURSING
1139 REC'D TO PACU DROWSY IN STABLE CONDITION. VSS. SATS 100% ON 6L/FM. WILL TITRATE O2 AS NEEDED. NO S/S OF PAIN NOTED AT THIS TIME. DERMABOND NOTED TO ABD C/D/I. WILL CONT TO MONITOR.     1155 UPDATE GIVEN TO  VIA TEXT MESSAGE.     1205 DEMEROL 25MG GIVEN FOR TREMORS.    1228 RETURNED TO ASC #3 IN STABLE CONDITION. /62 HR 90 SATS 98% ON RA. REPORT GIVEN TO JHOANA MONTANEZ. FAMILY AT BEDSIDE.

## 2023-12-15 NOTE — ANESTHESIA PREPROCEDURE EVALUATION
12/15/2023  Dona Villarreal is a 73 y.o., female.      Pre-op Assessment    I have reviewed the Patient Summary Reports.     I have reviewed the Nursing Notes. I have reviewed the NPO Status.   I have reviewed the Medications.     Review of Systems  Anesthesia Hx:  No problems with previous Anesthesia                Social:  Non-Smoker, No Alcohol Use       Hematology/Oncology:  Hematology Normal                  Hematology Comments: Hx multiple myeloma - remission    Current/Recent Cancer.                EENT/Dental:  EENT/Dental Normal           Cardiovascular:  Cardiovascular Normal                                            Pulmonary:  Pulmonary Normal                       Renal/:  Renal/ Normal                 Hepatic/GI:  Hepatic/GI Normal                 Musculoskeletal:  Musculoskeletal Normal                Neurological:   CVA                                    Endocrine:   Hypothyroidism          Dermatological:  Skin Normal    Psych:  Psychiatric Normal                    Physical Exam  General: Well nourished    Airway:  Mallampati: II / II  Mouth Opening: Normal  TM Distance: > 6 cm  Tongue: Normal  Neck ROM: Normal ROM    Chest/Lungs:  Clear to auscultation, Normal Respiratory Rate    Heart:  Rate: Normal  Rhythm: Regular Rhythm        Anesthesia Plan  Type of Anesthesia, risks & benefits discussed:    Anesthesia Type: Gen Supraglottic Airway  Intra-op Monitoring Plan: Standard ASA Monitors  Post Op Pain Control Plan: multimodal analgesia  Induction:  IV  Informed Consent: Informed consent signed with the Patient and all parties understand the risks and agree with anesthesia plan.  All questions answered. Patient consented to blood products? Yes  ASA Score: 3  Day of Surgery Review of History & Physical: H&P Update referred to the surgeon/provider.I have interviewed and examined the  patient. I have reviewed the patient's H&P dated: There are no significant changes.     Ready For Surgery From Anesthesia Perspective.     .

## 2023-12-15 NOTE — H&P
[facility] - Periop Services  History & Physical    Subjective:      Chief Complaint/Reason for Admission: abnormal bleeding  on anticoagulant / pelvic pain /right ovarian severe pain for laparoscopic  BSO as well as ablation     Dona Villarreal is a 73 y.o. female with continued vaginal bleeding on an anticoagulant ... Bleeding has not responded to conservative treatment . She is here for a D and C / hysteroscopy / ablation and had originally been scheduled for a laparoscopy and eval of pelvic pain / BSO but was cancelled on the laparoscopic part secondary to her anticoagulant .. She has been off for 2 days and reports that she has sever pain 10/10 and wants her ovaries removed and her appendix evaluated as well ....  she reports pain is unbearable.. Risks discussed with the patient and she has signed an additional consent for lap BSO     Past Medical History:   Diagnosis Date    Hyperlipidemia     Hypothyroidism     Osteoporosis      History reviewed. No pertinent surgical history.  N/A  Family history is reviewed and has not changed   Social History     Tobacco Use    Smoking status: Never     Passive exposure: Never    Smokeless tobacco: Never   Substance Use Topics    Alcohol use: Never       PTA Medications   Medication Sig    clopidogreL (PLAVIX) 75 mg tablet Take 75 mg by mouth once daily at 6am.    docusate sodium (COLACE) 100 MG capsule 1 capsule as needed    levothyroxine (SYNTHROID) 137 MCG Tab tablet Take 0.5 tablets by mouth once daily at 6am.    potassium chloride (MICRO-K) 10 MEQ CpSR Take 20 mEq by mouth 2 (two) times daily.    rosuvastatin (CRESTOR) 20 MG tablet Take 20 mg by mouth once daily at 6am.    acyclovir (ZOVIRAX) 400 MG tablet Take 400 mg by mouth 2 (two) times daily.    aspirin 81 MG Chew Take 81 mg by mouth.    cholecalciferol, vitamin D3, 125 mcg (5,000 unit) Tab 5,000 Int'l Units.    cyanocobalamin, vitamin B-12, 500 mcg Chew 1 tablet     Review of patient's allergies  indicates:   Allergen Reactions    Sulfamethoxazole-trimethoprim Anaphylaxis, Itching and Rash    Ciprofloxacin Itching     Thrush      Levofloxacin Other (See Comments)     Other reaction(s): gi distress  painful shoulder pain @ joints      Amoxicillin Itching    Tegaderm ag mesh [silver]      Other reaction(s): Unknown    Amoxicillin-pot clavulanate Rash        Review of Systems   Constitutional: Negative.    HENT: Negative.     Eyes: Negative.    Respiratory: Negative.     Cardiovascular: Negative.    Gastrointestinal: Negative.    Genitourinary: Negative.    Musculoskeletal: Negative.    Skin: Negative.    Neurological: Negative.    Endo/Heme/Allergies: Negative.    Psychiatric/Behavioral: Negative.         Objective:      Vital Signs (Most Recent)  Temp: 98.2 °F (36.8 °C) (12/15/23 0743)  Pulse: 80 (12/15/23 0743)  Resp: 18 (12/15/23 0743)  BP: (!) 146/63 (12/15/23 0743)  SpO2: 97 % (12/15/23 0743)    Vital Signs Range (Last 24H):  Temp:  [98.2 °F (36.8 °C)]   Pulse:  [80]   Resp:  [18]   BP: (146)/(63)   SpO2:  [97 %]     Physical Exam  Constitutional:       Appearance: She is normal weight.   HENT:      Nose: Nose normal.      Mouth/Throat:      Mouth: Mucous membranes are moist.      Pharynx: Oropharynx is clear.   Eyes:      Extraocular Movements: Extraocular movements intact.      Conjunctiva/sclera: Conjunctivae normal.      Pupils: Pupils are equal, round, and reactive to light.   Cardiovascular:      Rate and Rhythm: Normal rate and regular rhythm.   Pulmonary:      Effort: Pulmonary effort is normal.      Breath sounds: Normal breath sounds.   Abdominal:      General: Abdomen is flat. Bowel sounds are normal.      Palpations: Abdomen is soft.   Genitourinary:     General: Normal vulva.   Musculoskeletal:         General: Normal range of motion.      Cervical back: Normal range of motion and neck supple.   Skin:     General: Skin is warm.      Capillary Refill: Capillary refill takes less than 2  seconds.   Neurological:      General: No focal deficit present.      Mental Status: She is alert and oriented to person, place, and time. Mental status is at baseline.   Psychiatric:         Mood and Affect: Mood normal.         Behavior: Behavior normal.         Thought Content: Thought content normal.         Judgment: Judgment normal.         Data Review:  CBC:   Lab Results   Component Value Date    WBC 5.27 12/11/2023    RBC 3.70 (L) 12/11/2023    HGB 11.0 (L) 12/11/2023    HCT 34.5 (L) 12/11/2023     12/11/2023      ECG: no prior ECG.     Assessment:      There are no hospital problems to display for this patient.      Plan:    D and C / hysteroscopy / ablation

## 2023-12-15 NOTE — OP NOTE
[facility] - Periop Services  General Surgery  Operative Note    SUMMARY     Date of Procedure: 12/15/2023     Procedure: Procedure(s) (LRB):  HYSTEROSCOPY, WITH DILATION AND CURETTAGE OF UTERUS AND DEYSI ENDOMETRIAL ABLATION (N/A)       Surgeon(s) and Role:     * Estella Steve, DO - Primary    Assisting Surgeon: None    Pre-Operative Diagnosis: Pelvic pain in female [R10.2]    Post-Operative Diagnosis: Post-Op Diagnosis Codes:     * Pelvic pain in female [R10.2]     * Abnormal uterine bleeding (AUB) [N93.9]    Anesthesia: General    Operative Findings (including complications, if any):     Description of Technical Procedures: PT was taken back tot the OR  where she was prepped and draped in the usual sterile fashion with the umbilical incision placed in the umbilicus and the verress needle inserted for an adequate pneumo... the pneumo was achieved and then the 5 mm trocar was inserted ... The suprapubic 11 was placed  in the suprapubic area and the blunt probe was inserted with the anatomy surveyed ( see findings above) .. The IP was identified  bilaterally and the upper portion was grasped with a tenaculum and the PK was used to cauterize all the way across... the tube and ovary were removed and this was repeated on the opposite side.. the tubes were removed and then pulled through the endobag and removed.... the attention was then turned to the vaginal area where the cervix was visualized and then the cervix was grasped and the cervix was dilated and the camera was inserted and the cavity assessed and then the curretage was performed and then the deysi was sounded and the measurements were assessed and device inserted and then the device passed the safety testing and was finished and the device was inserted and then cauterized for 120 seconds ... The cautery was finished and then the hysteroscope was inserted and the cavity assessed ... Well tolerated ... The incisions were closed with 3-0  vicyrl on the abdomen with a deep 0 vicryl in the suprapubic area      Significant Surgical Tasks Conducted by the Assistant(s), if Applicable: none    Estimated Blood Loss (EBL): * No values recorded between 12/15/2023 12:00 AM and 12/15/2023  9:47 AM  25 ml   Implants: * No implants in log *    Specimens:   Specimen (24h ago, onward)      None                    Condition: Good    Disposition: PACU - hemodynamically stable.    Attestation: I was present and scrubbed for the entire procedure.

## 2023-12-15 NOTE — DISCHARGE INSTRUCTIONS
Pelvic rest until follow up. Showers only. No heavy lifting. Call for any problems. Keep follow up appt. With Dr Steve.

## 2023-12-18 LAB
ESTROGEN SERPL-MCNC: NORMAL PG/ML
INSULIN SERPL-ACNC: NORMAL U[IU]/ML
LAB AP GROSS DESCRIPTION: NORMAL
LAB AP LABORATORY NOTES: NORMAL
T3RU NFR SERPL: NORMAL %

## 2024-02-28 ENCOUNTER — HOSPITAL ENCOUNTER (OUTPATIENT)
Dept: RADIOLOGY | Facility: HOSPITAL | Age: 74
Discharge: HOME | End: 2024-02-28
Attending: NURSE PRACTITIONER
Payer: MEDICARE

## 2024-02-28 ENCOUNTER — OFFICE VISIT (OUTPATIENT)
Dept: INTERNAL MEDICINE | Facility: CLINIC | Age: 74
End: 2024-02-28
Payer: MEDICARE

## 2024-02-28 VITALS
OXYGEN SATURATION: 98 % | DIASTOLIC BLOOD PRESSURE: 60 MMHG | HEIGHT: 59 IN | WEIGHT: 104 LBS | BODY MASS INDEX: 20.96 KG/M2 | SYSTOLIC BLOOD PRESSURE: 106 MMHG | HEART RATE: 82 BPM

## 2024-02-28 DIAGNOSIS — M81.0 AGE-RELATED OSTEOPOROSIS WITHOUT CURRENT PATHOLOGICAL FRACTURE: ICD-10-CM

## 2024-02-28 DIAGNOSIS — M81.0 AGE-RELATED OSTEOPOROSIS WITHOUT CURRENT PATHOLOGICAL FRACTURE: Primary | ICD-10-CM

## 2024-02-28 PROCEDURE — 99999PBSHW PR PBB SHADOW TECHNICAL ONLY FILED TO HB: Mod: JZ,PBBFAC,,

## 2024-02-28 PROCEDURE — 77080 DXA BONE DENSITY AXIAL: CPT | Mod: 26,,, | Performed by: RADIOLOGY

## 2024-02-28 PROCEDURE — 99213 OFFICE O/P EST LOW 20 MIN: CPT | Mod: PBBFAC,25 | Performed by: NURSE PRACTITIONER

## 2024-02-28 PROCEDURE — 77080 DXA BONE DENSITY AXIAL: CPT | Mod: TC

## 2024-02-28 PROCEDURE — 96372 THER/PROPH/DIAG INJ SC/IM: CPT | Mod: PBBFAC | Performed by: NURSE PRACTITIONER

## 2024-02-28 PROCEDURE — 99499 UNLISTED E&M SERVICE: CPT | Mod: S$PBB,,, | Performed by: NURSE PRACTITIONER

## 2024-02-28 RX ADMIN — DENOSUMAB 60 MG: 60 INJECTION SUBCUTANEOUS at 10:02

## 2024-05-07 NOTE — PROGRESS NOTES
"  HPI/CC  Ms. Dona Villarreal is a 73 year old  female referred for DXA scan and bone density consultation by Dr. Estella Steve. Her PCP is Dr. Chelsey Killian.  She is treated for hypothyroidism and hyperlipidemia.   She has been treated for multiple myeloma, which is in remission following stem cell transplant.   Her oncology providers are Dr. Alicea at [facility] and Dr. Portillo at the [facility].   Patient denies any known signs/symptoms of adverse effects of Prolia.    She returns for "injection only" for Prolia    Labs Reviewed:  Vit D 2/12/24 56.8        Calcium  2/12/24  9.7    Calcium/Vit D  Taking 5000 IU Vit D daily and consumes various dairy products    Dental status:  Sees dentist on regular basis.  No major dental procedures are anticipated        Assessment:       1. Osteoporosis    2.      Multiple myeloma  3.      Hypothyroidism  4.      History of stroke  Plan:   RX Prolia today  2.  Lab order:  Serum Calcium within 2 weeks of Prolia  3.  PA Prolia  4.  Return for Prolia in 6 mo            "

## 2024-05-21 DIAGNOSIS — Z12.31 BREAST CANCER SCREENING BY MAMMOGRAM: Primary | ICD-10-CM

## 2024-06-11 ENCOUNTER — HOSPITAL ENCOUNTER (OUTPATIENT)
Dept: RADIOLOGY | Facility: HOSPITAL | Age: 74
Discharge: HOME | End: 2024-06-11
Payer: MEDICARE

## 2024-06-11 VITALS — BODY MASS INDEX: 20.56 KG/M2 | HEIGHT: 59 IN | WEIGHT: 102 LBS

## 2024-06-11 DIAGNOSIS — Z12.31 BREAST CANCER SCREENING BY MAMMOGRAM: ICD-10-CM

## 2024-06-11 PROCEDURE — 77067 SCR MAMMO BI INCL CAD: CPT | Mod: TC

## 2024-08-05 ENCOUNTER — TELEPHONE (OUTPATIENT)
Dept: INTERNAL MEDICINE | Facility: CLINIC | Age: 74
End: 2024-08-05
Payer: MEDICARE

## 2024-08-26 ENCOUNTER — TELEPHONE (OUTPATIENT)
Dept: INTERNAL MEDICINE | Facility: CLINIC | Age: 74
End: 2024-08-26
Payer: MEDICARE

## 2024-08-26 DIAGNOSIS — M81.0 AGE-RELATED OSTEOPOROSIS WITHOUT CURRENT PATHOLOGICAL FRACTURE: Primary | ICD-10-CM

## 2024-08-28 ENCOUNTER — CLINICAL SUPPORT (OUTPATIENT)
Dept: INTERNAL MEDICINE | Facility: CLINIC | Age: 74
End: 2024-08-28
Payer: MEDICARE

## 2024-08-28 VITALS
HEIGHT: 59 IN | DIASTOLIC BLOOD PRESSURE: 66 MMHG | SYSTOLIC BLOOD PRESSURE: 110 MMHG | BODY MASS INDEX: 20.96 KG/M2 | WEIGHT: 104 LBS

## 2024-08-28 DIAGNOSIS — M81.0 AGE-RELATED OSTEOPOROSIS WITHOUT CURRENT PATHOLOGICAL FRACTURE: Primary | ICD-10-CM

## 2024-08-28 PROCEDURE — 96372 THER/PROPH/DIAG INJ SC/IM: CPT | Mod: PBBFAC | Performed by: NURSE PRACTITIONER

## 2024-08-28 PROCEDURE — 99999PBSHW PR PBB SHADOW TECHNICAL ONLY FILED TO HB: Mod: JZ,PBBFAC,,

## 2024-08-28 PROCEDURE — 99999 PR PBB SHADOW E&M-EST. PATIENT-LVL III: CPT | Mod: PBBFAC,,, | Performed by: NURSE PRACTITIONER

## 2024-08-28 PROCEDURE — 99213 OFFICE O/P EST LOW 20 MIN: CPT | Mod: PBBFAC | Performed by: NURSE PRACTITIONER

## 2024-08-28 RX ORDER — DOCUSATE SODIUM 100 MG/1
1 CAPSULE, LIQUID FILLED ORAL
COMMUNITY

## 2024-08-28 RX ORDER — POTASSIUM CHLORIDE 750 MG/1
1 CAPSULE, EXTENDED RELEASE ORAL
COMMUNITY

## 2024-08-28 RX ORDER — LEVOTHYROXINE SODIUM 137 UG/1
TABLET ORAL
COMMUNITY

## 2024-08-28 RX ORDER — CLOPIDOGREL BISULFATE 75 MG/1
TABLET ORAL
COMMUNITY

## 2024-08-28 RX ORDER — VIT C/E/ZN/COPPR/LUTEIN/ZEAXAN 250MG-90MG
1 CAPSULE ORAL
COMMUNITY

## 2024-08-28 RX ADMIN — DENOSUMAB 60 MG: 60 INJECTION SUBCUTANEOUS at 01:08

## 2024-08-28 NOTE — PROGRESS NOTES
Pt here for 6mth injection. Pt states no current is or no new joint pain. Calcium 10.3 8/28/24  Last Vit D 56.8 on 2/12/24

## 2025-02-27 DIAGNOSIS — M81.0 AGE-RELATED OSTEOPOROSIS WITHOUT CURRENT PATHOLOGICAL FRACTURE: Primary | ICD-10-CM

## 2025-02-27 RX ORDER — CYANOCOBALAMIN 1000 UG/ML
1000 INJECTION, SOLUTION INTRAMUSCULAR; SUBCUTANEOUS
COMMUNITY
End: 2025-03-05

## 2025-02-27 RX ORDER — LUBIPROSTONE 8 UG/1
8 CAPSULE ORAL
COMMUNITY
Start: 2024-05-14

## 2025-02-27 RX ORDER — TRAMADOL HYDROCHLORIDE 50 MG/1
50 TABLET ORAL
COMMUNITY
Start: 2024-05-03

## 2025-02-27 RX ORDER — NAPROXEN SODIUM 220 MG/1
81 TABLET, FILM COATED ORAL
COMMUNITY
End: 2025-03-05

## 2025-02-27 RX ORDER — OXYBUTYNIN CHLORIDE 5 MG/1
5 TABLET, EXTENDED RELEASE ORAL
COMMUNITY
Start: 2025-02-24 | End: 2025-03-26

## 2025-02-27 RX ORDER — CEPHALEXIN 500 MG/1
500 CAPSULE ORAL
COMMUNITY
Start: 2025-02-24 | End: 2025-03-01

## 2025-03-05 ENCOUNTER — OFFICE VISIT (OUTPATIENT)
Dept: INTERNAL MEDICINE | Facility: CLINIC | Age: 75
End: 2025-03-05
Payer: MEDICARE

## 2025-03-05 VITALS
RESPIRATION RATE: 16 BRPM | DIASTOLIC BLOOD PRESSURE: 64 MMHG | SYSTOLIC BLOOD PRESSURE: 112 MMHG | HEART RATE: 80 BPM | OXYGEN SATURATION: 98 %

## 2025-03-05 DIAGNOSIS — M81.0 AGE-RELATED OSTEOPOROSIS WITHOUT CURRENT PATHOLOGICAL FRACTURE: Primary | ICD-10-CM

## 2025-03-05 DIAGNOSIS — C90.01 MULTIPLE MYELOMA IN REMISSION: ICD-10-CM

## 2025-03-05 DIAGNOSIS — Z86.73 HISTORY OF STROKE: ICD-10-CM

## 2025-03-05 PROCEDURE — 99214 OFFICE O/P EST MOD 30 MIN: CPT | Mod: PBBFAC | Performed by: NURSE PRACTITIONER

## 2025-03-05 PROCEDURE — 96372 THER/PROPH/DIAG INJ SC/IM: CPT | Mod: PBBFAC | Performed by: NURSE PRACTITIONER

## 2025-03-05 PROCEDURE — 99999 PR PBB SHADOW E&M-EST. PATIENT-LVL IV: CPT | Mod: PBBFAC,,, | Performed by: NURSE PRACTITIONER

## 2025-03-05 PROCEDURE — 99999PBSHW PR PBB SHADOW TECHNICAL ONLY FILED TO HB: Mod: JZ,PBBFAC,,

## 2025-03-05 PROCEDURE — 99213 OFFICE O/P EST LOW 20 MIN: CPT | Mod: S$PBB,25,, | Performed by: NURSE PRACTITIONER

## 2025-03-05 RX ORDER — ONDANSETRON 8 MG/1
8 TABLET, ORALLY DISINTEGRATING ORAL EVERY 8 HOURS PRN
COMMUNITY
Start: 2025-01-11

## 2025-03-05 RX ADMIN — DENOSUMAB 60 MG: 60 INJECTION SUBCUTANEOUS at 02:03

## 2025-03-05 NOTE — PROGRESS NOTES
HPI/CC  Ms. Dona Villarreal is a 74 year old  female referred for DXA scan and bone density consultation by Dr. Estella Steve. Her PCP is Dr. Chelsey Killian.  She is treated for hypothyroidism and hyperlipidemia.   She has been treated for multiple myeloma, which is in remission following stem cell transplant.   Her oncology providers are Dr. Alicea at [facility] and Dr. Portillo at the [facility] at [facility]. She had evaluation at [facility] in 2024 with no changes in treatment plan.  Patient denies any known signs/symptoms of adverse effects of Prolia.    Labs Reviewed:   Calcium 3/3/2025: 10.1                                 Vitamin D  9/23/2024: 69.1  (Dr. Killian monitors Vit D levels)    DXA Scan  I reviewed images and report of DXA performed 2/28/2024 at [facility]. T-Scores:  Total Spine -2.9, Femoral Neck -3.2    Compared to results of DXA scan performed 2/2/2022 at [facility].  T-scores:  Spine -3.4;  Femoral neck -3.4   In range of osteoporosis    Fracture History  Denies fractures in adulthood.  States mother had osteoporosis, severe kyphosis, and femur fracture    Calcium/Vit D  Taking 5000 IU Vit D daily and consumes various dairy products    Weight bearing exercise No restrictions on activity    Dental status:  Sees dentist on regular basis.  No major dental procedures are anticipated     Risk factors for osteoporosis/fracture  Positive for  Postmenopausal  female  (no hysterectomy)  Small frame; low body weight  Mother had osteoporosis and fracture of femur (mid thigh)  Long term use of corticosteroids     Negative for  Never smoked  Does not drink alcohol  No history of rheumatoid arthritis     Review of Systems   Constitutional: Negative for fever, chills, malaise.    Respiratory: Negative for cough and shortness of breath.    Cardiovascular: Negative for chest pain and palpitations.   Gastrointestinal:  Negative for change in bowel habit, constipation   Neurological: Negative for dizziness, syncope, weakness and light-headedness.   Musculoskeletal:  Requires no assistive devices for ambulation  Dental:  No dental issues.    Physical Exam  Constitutional:       Appearance:In no distress  HENT:      Mouth/Throat:      Mouth: Mucous membranes are moist. Has partial plate  Eyes:      Conjunctiva/sclera: Conjunctivae normal. Pupils equal  Cardiovascular:      Rate and Rhythm: Normal rate and regular rhythm.   Pulmonary:      Effort: Pulmonary effort is normal.   Musculoskeletal:      Cervical spine: Normal range of motion.      Thoracic spine:  Normal range of motion.  No kyphosis     Lumbar spine: Normal range of motion. No scoliosis.   Skin:     General: Skin is warm and dry.   Neurological:      Mental Status: She is alert and oriented to person, place, and time.      Assessment:       1. Osteoporosis    2.      Multiple myeloma  3.      Hypothyroidism  4.      History of stroke  Plan:   1.  I have previously spoken with oncologists (at [facility] and [facility]) regarding DXA scan report and possible treatment.  Reviewed encounter notes of Dr. Hodge at [facility].   2.  Reviewed expected benefits and possible serious side effects of Prolia.  Possible serious side effects include low calcium, severe jaw bone problems, unusual thigh bone fractures, serious infections, and skin problems.  Reminder of importance of adhering to every six months schedule for Prolia injections.  Reminder that calcium will be checked within 2 weeks of each Prolia injection.   3.  RX Prolia injection today  4.  Lab order:  Serum Calcium  within 2 weeks of Prolia  5.  PA Prolia  6.  Return 6 months for Prolia injection.  (Nurse visit)

## 2025-06-03 ENCOUNTER — OFFICE VISIT (OUTPATIENT)
Dept: OTOLARYNGOLOGY | Facility: CLINIC | Age: 75
End: 2025-06-03
Payer: MEDICARE

## 2025-06-03 VITALS — BODY MASS INDEX: 21.37 KG/M2 | WEIGHT: 106 LBS | HEIGHT: 59 IN

## 2025-06-03 DIAGNOSIS — H65.23 BILATERAL CHRONIC SEROUS OTITIS MEDIA: Primary | ICD-10-CM

## 2025-06-03 DIAGNOSIS — H69.93 ETD (EUSTACHIAN TUBE DYSFUNCTION), BILATERAL: ICD-10-CM

## 2025-06-03 PROCEDURE — 99204 OFFICE O/P NEW MOD 45 MIN: CPT | Mod: S$PBB,,, | Performed by: OTOLARYNGOLOGY

## 2025-06-03 PROCEDURE — 99999 PR PBB SHADOW E&M-EST. PATIENT-LVL V: CPT | Mod: PBBFAC,,, | Performed by: OTOLARYNGOLOGY

## 2025-06-03 PROCEDURE — 99215 OFFICE O/P EST HI 40 MIN: CPT | Mod: PBBFAC | Performed by: OTOLARYNGOLOGY

## 2025-06-10 ENCOUNTER — ANESTHESIA EVENT (OUTPATIENT)
Dept: SURGERY | Facility: HOSPITAL | Age: 75
End: 2025-06-10
Payer: MEDICARE

## 2025-06-10 ENCOUNTER — HOSPITAL ENCOUNTER (OUTPATIENT)
Facility: HOSPITAL | Age: 75
Discharge: HOME | End: 2025-06-10
Attending: OTOLARYNGOLOGY | Admitting: OTOLARYNGOLOGY
Payer: MEDICARE

## 2025-06-10 ENCOUNTER — ANESTHESIA (OUTPATIENT)
Dept: SURGERY | Facility: HOSPITAL | Age: 75
End: 2025-06-10
Payer: MEDICARE

## 2025-06-10 VITALS
OXYGEN SATURATION: 100 % | HEART RATE: 72 BPM | SYSTOLIC BLOOD PRESSURE: 166 MMHG | HEIGHT: 59 IN | RESPIRATION RATE: 17 BRPM | BODY MASS INDEX: 21.37 KG/M2 | TEMPERATURE: 98 F | WEIGHT: 106 LBS | DIASTOLIC BLOOD PRESSURE: 65 MMHG

## 2025-06-10 DIAGNOSIS — H65.23 CHRONIC SEROUS OTITIS MEDIA OF BOTH EARS: Primary | ICD-10-CM

## 2025-06-10 PROCEDURE — 27201423 OPTIME MED/SURG SUP & DEVICES STERILE SUPPLY: Performed by: OTOLARYNGOLOGY

## 2025-06-10 PROCEDURE — 36000704 HC OR TIME LEV I 1ST 15 MIN: Performed by: OTOLARYNGOLOGY

## 2025-06-10 PROCEDURE — 27000284 HC CANNULA NASAL: Performed by: NURSE ANESTHETIST, CERTIFIED REGISTERED

## 2025-06-10 PROCEDURE — 27000716 HC OXISENSOR PROBE, ANY SIZE: Performed by: NURSE ANESTHETIST, CERTIFIED REGISTERED

## 2025-06-10 PROCEDURE — 71000033 HC RECOVERY, INTIAL HOUR: Performed by: OTOLARYNGOLOGY

## 2025-06-10 PROCEDURE — 63600175 PHARM REV CODE 636 W HCPCS: Performed by: NURSE ANESTHETIST, CERTIFIED REGISTERED

## 2025-06-10 PROCEDURE — 37000008 HC ANESTHESIA 1ST 15 MINUTES: Performed by: OTOLARYNGOLOGY

## 2025-06-10 PROCEDURE — 71000015 HC POSTOP RECOV 1ST HR: Performed by: OTOLARYNGOLOGY

## 2025-06-10 PROCEDURE — 69436 CREATE EARDRUM OPENING: CPT | Mod: 50,,, | Performed by: OTOLARYNGOLOGY

## 2025-06-10 DEVICE — GROMMET MOD ARMSTR 1.14MM: Type: IMPLANTABLE DEVICE | Site: EAR | Status: FUNCTIONAL

## 2025-06-10 RX ORDER — LIDOCAINE HYDROCHLORIDE 20 MG/ML
INJECTION, SOLUTION EPIDURAL; INFILTRATION; INTRACAUDAL; PERINEURAL
Status: DISCONTINUED | OUTPATIENT
Start: 2025-06-10 | End: 2025-06-10

## 2025-06-10 RX ORDER — IPRATROPIUM BROMIDE AND ALBUTEROL SULFATE 2.5; .5 MG/3ML; MG/3ML
3 SOLUTION RESPIRATORY (INHALATION) ONCE
Status: DISCONTINUED | OUTPATIENT
Start: 2025-06-10 | End: 2025-06-10 | Stop reason: HOSPADM

## 2025-06-10 RX ORDER — SODIUM CHLORIDE 9 MG/ML
INJECTION, SOLUTION INTRAVENOUS CONTINUOUS
Status: DISCONTINUED | OUTPATIENT
Start: 2025-06-10 | End: 2025-06-10 | Stop reason: HOSPADM

## 2025-06-10 RX ORDER — PROPOFOL 10 MG/ML
VIAL (ML) INTRAVENOUS
Status: DISCONTINUED | OUTPATIENT
Start: 2025-06-10 | End: 2025-06-10

## 2025-06-10 RX ADMIN — PROPOFOL 50 MG: 10 INJECTION, EMULSION INTRAVENOUS at 07:06

## 2025-06-10 RX ADMIN — LIDOCAINE HYDROCHLORIDE 60 MG: 20 INJECTION, SOLUTION EPIDURAL; INFILTRATION; INTRACAUDAL; PERINEURAL at 07:06

## 2025-06-10 NOTE — OR NURSING
0748 RECEIVED PT FROM OR. PT RESTING WITH EYES CLOSED    0800 EASILY AROUSED, DENIES NEEDS    0815 RESTING QUIETLY. PT TO ROOM VIA STRETCHER    0820 PT RELEASED TO CARLEY PRIEST RN  FAMILY IN ROOM  150/73, 73, 16, 100%(ROOM AIR)

## 2025-06-10 NOTE — BRIEF OP NOTE
[facility] - Periop Services  Brief Operative Note    Surgery Date: 6/10/2025     Surgeons and Role:     * Romulo Storm MD - Primary    Assisting Surgeon: None    Pre-op Diagnosis:  Bilateral chronic serous otitis media [H65.23]    Post-op Diagnosis:  Post-Op Diagnosis Codes:     * Bilateral chronic serous otitis media [H65.23]    Procedure(s) (LRB):  MYRINGOTOMY, WITH TYMPANOSTOMY TUBE INSERTION (Bilateral)    Anesthesia: General    Operative Findings: Fluid    Estimated Blood Loss: 0         Specimens:   Specimen (24h ago, onward)      None            * No specimens in log *        Discharge Note    OUTCOME: Patient tolerated treatment/procedure well without complication and is now ready for discharge.    DISPOSITION: Home or Self Care    FINAL DIAGNOSIS:  <principal problem not specified>    FOLLOWUP: In clinic      DISCHARGE INSTRUCTIONS:  No discharge procedures on file.

## 2025-06-10 NOTE — OP NOTE
Surgery Date: 6/10/2025     Surgeons and Role:     * Romulo Storm MD - Primary    Assisting Surgeon: None    Pre-op Diagnosis:  Bilateral chronic serous otitis media [H65.23]    Post-op Diagnosis:  Post-Op Diagnosis Codes:     * Bilateral chronic serous otitis media [H65.23]    Procedure(s) (LRB):  MYRINGOTOMY, WITH TYMPANOSTOMY TUBE INSERTION (Bilateral)    After general mask anesthesia using the operating microscope the left ear was examined and a myringotomy was performed in the anterior inferior quadrant and a grommet tube was placed without difficulty excess middle ear  fluid was suctioned. The opposite ear was done in a likewise fashion the patient tolerated the procedure well and was reversed taken to RR in stable condition           Anesthesia: General    Operative Findings: Fluid    Estimated Blood Loss: 0

## 2025-06-10 NOTE — ANESTHESIA PREPROCEDURE EVALUATION
06/10/2025  Dona Villarreal is a 75 y.o., female.      Pre-op Assessment    I have reviewed the Patient Summary Reports.     I have reviewed the Nursing Notes. I have reviewed the NPO Status.   I have reviewed the Medications.     Review of Systems  Anesthesia Hx:  No problems with previous Anesthesia                Social:  Non-Smoker, No Alcohol Use       Hematology/Oncology:  Hematology Normal                  Hematology Comments: Hx multiple myeloma - remission    Current/Recent Cancer.                EENT/Dental:  EENT/Dental Normal           Cardiovascular:  Cardiovascular Normal                                              Pulmonary:  Pulmonary Normal                       Renal/:  Renal/ Normal                 Hepatic/GI:  Hepatic/GI Normal                    Musculoskeletal:  Musculoskeletal Normal                Neurological:   CVA, no residual symptoms                                    Endocrine:   Hypothyroidism          Dermatological:  Skin Normal    Psych:  Psychiatric Normal                    Physical Exam  General: Well nourished    Airway:  Mallampati: II / II  Mouth Opening: Normal  TM Distance: > 6 cm  Tongue: Normal  Neck ROM: Normal ROM    Chest/Lungs:  Clear to auscultation, Normal Respiratory Rate    Heart:  Rate: Normal  Rhythm: Regular Rhythm        Anesthesia Plan  Type of Anesthesia, risks & benefits discussed:    Anesthesia Type: Gen Natural Airway  Intra-op Monitoring Plan: Standard ASA Monitors  Post Op Pain Control Plan: multimodal analgesia  Induction:  IV  Informed Consent: Informed consent signed with the Patient and all parties understand the risks and agree with anesthesia plan.  All questions answered. Patient consented to blood products? Yes  ASA Score: 3  Day of Surgery Review of History & Physical: H&P Update referred to the surgeon/provider.I have interviewed  and examined the patient. I have reviewed the patient's H&P dated: There are no significant changes.     Ready For Surgery From Anesthesia Perspective.     .

## 2025-06-10 NOTE — TRANSFER OF CARE
"Anesthesia Transfer of Care Note    Patient: Dona Villarreal    Procedure(s) Performed: Procedure(s) (LRB):  MYRINGOTOMY, WITH TYMPANOSTOMY TUBE INSERTION (Bilateral)    Patient location: PACU    Anesthesia Type: general    Transport from OR: Transported from OR on room air with adequate spontaneous ventilation    Post pain: adequate analgesia    Post assessment: no apparent anesthetic complications    Post vital signs: stable    Level of consciousness: awake    Nausea/Vomiting: no nausea/vomiting    Complications: none    Transfer of care protocol was followed      Last vitals: Visit Vitals  BP (!) 168/66   Pulse 85   Temp 36.7 °C (98.1 °F) (Oral)   Resp (!) 21   Ht 4' 11" (1.499 m)   Wt 48.1 kg (106 lb)   SpO2 100%   Breastfeeding No   BMI 21.41 kg/m²     "

## 2025-06-16 ENCOUNTER — HOSPITAL ENCOUNTER (OUTPATIENT)
Dept: RADIOLOGY | Facility: HOSPITAL | Age: 75
Discharge: HOME | End: 2025-06-16
Attending: OBSTETRICS & GYNECOLOGY
Payer: MEDICARE

## 2025-06-16 DIAGNOSIS — Z12.31 OTHER SCREENING MAMMOGRAM: ICD-10-CM

## 2025-06-16 PROCEDURE — 77067 SCR MAMMO BI INCL CAD: CPT | Mod: 26,,, | Performed by: RADIOLOGY

## 2025-06-16 PROCEDURE — 77063 BREAST TOMOSYNTHESIS BI: CPT | Mod: 26,,, | Performed by: RADIOLOGY

## 2025-06-16 PROCEDURE — 77067 SCR MAMMO BI INCL CAD: CPT | Mod: TC

## 2025-06-19 ENCOUNTER — OFFICE VISIT (OUTPATIENT)
Dept: OTOLARYNGOLOGY | Facility: CLINIC | Age: 75
End: 2025-06-19
Payer: MEDICARE

## 2025-06-19 VITALS — HEIGHT: 59 IN | WEIGHT: 106 LBS | BODY MASS INDEX: 21.37 KG/M2

## 2025-06-19 DIAGNOSIS — H65.23 BILATERAL CHRONIC SEROUS OTITIS MEDIA: Primary | ICD-10-CM

## 2025-06-19 PROCEDURE — 99213 OFFICE O/P EST LOW 20 MIN: CPT | Mod: PBBFAC | Performed by: OTOLARYNGOLOGY

## 2025-06-19 PROCEDURE — 99999 PR PBB SHADOW E&M-EST. PATIENT-LVL III: CPT | Mod: PBBFAC,,, | Performed by: OTOLARYNGOLOGY

## 2025-06-19 PROCEDURE — 99024 POSTOP FOLLOW-UP VISIT: CPT | Mod: ,,, | Performed by: OTOLARYNGOLOGY

## 2025-06-19 NOTE — PROGRESS NOTES
Subjective:       Patient ID: Dona Villarreal is a 75 y.o. female.    Chief Complaint: Post-op Evaluation (Post-op bilateral P E tubes. Patient voices no complaints.)    HPI  Review of Systems    Objective:      Physical Exam  tubes open in place   Assessment:       1. Bilateral chronic serous otitis media        Plan:       F/u 3 months

## 2025-09-04 DIAGNOSIS — M81.0 AGE-RELATED OSTEOPOROSIS WITHOUT CURRENT PATHOLOGICAL FRACTURE: Primary | ICD-10-CM

## (undated) DEVICE — SOL NACL IRR 3000ML

## (undated) DEVICE — NDL ACCESS 14GA

## (undated) DEVICE — KIT GYN LAPAROSCOPY RUSH

## (undated) DEVICE — SEAL LENS SCOPE MYOSURE

## (undated) DEVICE — HANDPIECE MINERVA DISPOSABLE

## (undated) DEVICE — GLOVE PROTEXIS PI SYN SURG 6.0

## (undated) DEVICE — SUT 0 VICRYL / UR6 (J603)

## (undated) DEVICE — GLOVE SENSICARE PI SURG 7.5

## (undated) DEVICE — TROCAR ENDO Z THREAD KII 5X100

## (undated) DEVICE — KIT LITHOTOMY RUSH

## (undated) DEVICE — ADHESIVE DERMABOND ADVANCED

## (undated) DEVICE — GLOVE PROTEXIS PI SYN SURG 7.5

## (undated) DEVICE — TROCAR KII FIOS ZTHREAD 12X100

## (undated) DEVICE — PACK FLUENT DISPOSABLE

## (undated) DEVICE — GLOVE PROTEXIS PI SYN SURG 6.5

## (undated) DEVICE — TUBE SUCTION MEDI-VAC STERILE

## (undated) DEVICE — WARMER BLUE HEAT SCOPE 3-12MM

## (undated) DEVICE — SEALER LIGASURE MARYLAND 37CM

## (undated) DEVICE — SOL NACL IRR 1000ML BTL

## (undated) DEVICE — GLOVE 6.5 PROTEXIS PI BLUE

## (undated) DEVICE — GLOVE SENSICARE PI SURG 7

## (undated) DEVICE — TRAY VAG PREP

## (undated) DEVICE — SUT 4-0 VICRYL / FS-2

## (undated) DEVICE — CANNULA LAP SEAL Z THRD 5X100

## (undated) DEVICE — BLADE SPEAR TIP BEAVER 45DEG